# Patient Record
Sex: MALE | Race: WHITE | NOT HISPANIC OR LATINO | URBAN - METROPOLITAN AREA
[De-identification: names, ages, dates, MRNs, and addresses within clinical notes are randomized per-mention and may not be internally consistent; named-entity substitution may affect disease eponyms.]

---

## 2018-03-20 ENCOUNTER — APPOINTMENT (RX ONLY)
Dept: URBAN - METROPOLITAN AREA CLINIC 349 | Facility: CLINIC | Age: 53
Setting detail: DERMATOLOGY
End: 2018-03-20

## 2018-03-20 DIAGNOSIS — L57.0 ACTINIC KERATOSIS: ICD-10-CM

## 2018-03-20 DIAGNOSIS — L81.2 FRECKLES: ICD-10-CM

## 2018-03-20 DIAGNOSIS — D22 MELANOCYTIC NEVI: ICD-10-CM

## 2018-03-20 DIAGNOSIS — L91.8 OTHER HYPERTROPHIC DISORDERS OF THE SKIN: ICD-10-CM

## 2018-03-20 DIAGNOSIS — L57.8 OTHER SKIN CHANGES DUE TO CHRONIC EXPOSURE TO NONIONIZING RADIATION: ICD-10-CM

## 2018-03-20 PROBLEM — D22.5 MELANOCYTIC NEVI OF TRUNK: Status: ACTIVE | Noted: 2018-03-20

## 2018-03-20 PROBLEM — J30.1 ALLERGIC RHINITIS DUE TO POLLEN: Status: ACTIVE | Noted: 2018-03-20

## 2018-03-20 PROBLEM — L20.84 INTRINSIC (ALLERGIC) ECZEMA: Status: ACTIVE | Noted: 2018-03-20

## 2018-03-20 PROBLEM — F32.9 MAJOR DEPRESSIVE DISORDER, SINGLE EPISODE, UNSPECIFIED: Status: ACTIVE | Noted: 2018-03-20

## 2018-03-20 PROBLEM — I25.10 ATHEROSCLEROTIC HEART DISEASE OF NATIVE CORONARY ARTERY WITHOUT ANGINA PECTORIS: Status: ACTIVE | Noted: 2018-03-20

## 2018-03-20 PROCEDURE — 99202 OFFICE O/P NEW SF 15 MIN: CPT | Mod: 25

## 2018-03-20 PROCEDURE — 17003 DESTRUCT PREMALG LES 2-14: CPT

## 2018-03-20 PROCEDURE — ? COUNSELING

## 2018-03-20 PROCEDURE — ? SKIN TAG REMOVAL

## 2018-03-20 PROCEDURE — 11200 RMVL SKIN TAGS UP TO&INC 15: CPT | Mod: 59

## 2018-03-20 PROCEDURE — ? LIQUID NITROGEN

## 2018-03-20 PROCEDURE — 17000 DESTRUCT PREMALG LESION: CPT

## 2018-03-20 ASSESSMENT — LOCATION DETAILED DESCRIPTION DERM
LOCATION DETAILED: LEFT CENTRAL MALAR CHEEK
LOCATION DETAILED: LEFT LATERAL SUPERIOR CHEST
LOCATION DETAILED: RIGHT INFERIOR CENTRAL MALAR CHEEK
LOCATION DETAILED: LEFT POSTERIOR SHOULDER
LOCATION DETAILED: LEFT SUPERIOR PARIETAL SCALP
LOCATION DETAILED: RIGHT POSTERIOR SHOULDER
LOCATION DETAILED: INFERIOR MID FOREHEAD
LOCATION DETAILED: LEFT LATERAL INFERIOR CHEST
LOCATION DETAILED: STERNUM
LOCATION DETAILED: RIGHT ANTERIOR SHOULDER
LOCATION DETAILED: LEFT CHIN
LOCATION DETAILED: LEFT CENTRAL PARIETAL SCALP
LOCATION DETAILED: RIGHT SUPERIOR PARIETAL SCALP

## 2018-03-20 ASSESSMENT — LOCATION SIMPLE DESCRIPTION DERM
LOCATION SIMPLE: INFERIOR FOREHEAD
LOCATION SIMPLE: CHIN
LOCATION SIMPLE: RIGHT SHOULDER
LOCATION SIMPLE: CHEST
LOCATION SIMPLE: LEFT CHEEK
LOCATION SIMPLE: LEFT SHOULDER
LOCATION SIMPLE: RIGHT CHEEK
LOCATION SIMPLE: SCALP

## 2018-03-20 ASSESSMENT — LOCATION ZONE DERM
LOCATION ZONE: SCALP
LOCATION ZONE: TRUNK
LOCATION ZONE: ARM
LOCATION ZONE: FACE

## 2018-03-20 NOTE — PROCEDURE: SKIN TAG REMOVAL
Add Associated Diagnoses If Applicable When Selecting Medical Necessity: Yes
Medical Necessity Information: It is in your best interest to select a reason for this procedure from the list below. All of these items fulfill various CMS LCD requirements except the new and changing color options.
Include Z78.9 (Other Specified Conditions Influencing Health Status) As An Associated Diagnosis?: No
Anesthesia Volume In Cc: 0.1
Detail Level: Detailed
Anesthesia Type: 1% Xylocaine with epinephrine
Medical Necessity Clause: This procedure was medically necessary because the lesions that were treated were:
Consent: Written consent obtained and the risks of skin tag removal was reviewed with the patient including but not limited to bleeding, pigmentary change, infection, pain, and remote possibility of scarring. after the procedure, the patient was observed for 5-10 minutes and was oriented to,person, place and time and denied feeling dizzy, queasy and stated that they were not going to faint

## 2018-06-29 ENCOUNTER — APPOINTMENT (RX ONLY)
Dept: URBAN - METROPOLITAN AREA CLINIC 349 | Facility: CLINIC | Age: 53
Setting detail: DERMATOLOGY
End: 2018-06-29

## 2018-06-29 DIAGNOSIS — B35.1 TINEA UNGUIUM: ICD-10-CM

## 2018-06-29 DIAGNOSIS — L20.89 OTHER ATOPIC DERMATITIS: ICD-10-CM | Status: STABLE

## 2018-06-29 DIAGNOSIS — L43.8 OTHER LICHEN PLANUS: ICD-10-CM | Status: INADEQUATELY CONTROLLED

## 2018-06-29 PROBLEM — L20.84 INTRINSIC (ALLERGIC) ECZEMA: Status: ACTIVE | Noted: 2018-06-29

## 2018-06-29 PROCEDURE — ? PRESCRIPTION

## 2018-06-29 PROCEDURE — 99213 OFFICE O/P EST LOW 20 MIN: CPT

## 2018-06-29 PROCEDURE — ? COUNSELING

## 2018-06-29 PROCEDURE — ? TREATMENT REGIMEN

## 2018-06-29 RX ORDER — TAVABOROLE 43.5 MG/ML
SOLUTION TOPICAL
Qty: 1 | Refills: 11 | Status: ERX | COMMUNITY
Start: 2018-06-29

## 2018-06-29 RX ORDER — TRIAMCINOLONE ACETONIDE 1 MG/G
PASTE TOPICAL
Qty: 3 | Refills: 6 | Status: ERX | COMMUNITY
Start: 2018-06-29

## 2018-06-29 RX ORDER — CLOBETASOL PROPIONATE 0.5 MG/G
CREAM TOPICAL
Qty: 1 | Refills: 2 | Status: ERX | COMMUNITY
Start: 2018-06-29

## 2018-06-29 RX ADMIN — CLOBETASOL PROPIONATE: 0.5 CREAM TOPICAL at 00:00

## 2018-06-29 RX ADMIN — TRIAMCINOLONE ACETONIDE: 1 PASTE TOPICAL at 00:00

## 2018-06-29 RX ADMIN — TAVABOROLE: 43.5 SOLUTION TOPICAL at 00:00

## 2018-06-29 ASSESSMENT — LOCATION SIMPLE DESCRIPTION DERM
LOCATION SIMPLE: RIGHT GREAT TOE
LOCATION SIMPLE: LEFT 2ND TOE
LOCATION SIMPLE: CHEST
LOCATION SIMPLE: LEFT 3RD TOE
LOCATION SIMPLE: RIGHT 2ND TOE
LOCATION SIMPLE: LEFT GREAT TOE
LOCATION SIMPLE: LEFT BUCCAL MUCOSA
LOCATION SIMPLE: RIGHT 4TH TOE
LOCATION SIMPLE: LEFT 5TH TOE
LOCATION SIMPLE: RIGHT 3RD TOE

## 2018-06-29 ASSESSMENT — LOCATION DETAILED DESCRIPTION DERM
LOCATION DETAILED: RIGHT DORSAL 3RD TOE
LOCATION DETAILED: LEFT DORSAL 5TH TOE
LOCATION DETAILED: LEFT DORSAL 3RD TOE
LOCATION DETAILED: LEFT MEDIAL INFERIOR CHEST
LOCATION DETAILED: LEFT DORSAL GREAT TOE
LOCATION DETAILED: RIGHT DISTAL PLANTAR GREAT TOE
LOCATION DETAILED: LEFT DORSAL 2ND TOE
LOCATION DETAILED: LEFT BUCCAL MUCOSA
LOCATION DETAILED: RIGHT LATERAL 4TH TOE
LOCATION DETAILED: RIGHT DISTAL PLANTAR 2ND TOE

## 2018-06-29 ASSESSMENT — LOCATION ZONE DERM
LOCATION ZONE: MUCOUS_MEMBRANE
LOCATION ZONE: TOE
LOCATION ZONE: TRUNK

## 2019-10-09 ENCOUNTER — HOSPITAL ENCOUNTER (OUTPATIENT)
Dept: ULTRASOUND IMAGING | Age: 54
Discharge: HOME | End: 2019-10-09
Attending: FAMILY MEDICINE
Payer: COMMERCIAL

## 2019-10-09 DIAGNOSIS — R25.2 LEG CRAMPING: ICD-10-CM

## 2019-10-09 PROCEDURE — 93926 LOWER EXTREMITY STUDY: CPT

## 2019-10-09 PROCEDURE — 93925 LOWER EXTREMITY STUDY: CPT

## 2020-01-17 NOTE — PROGRESS NOTES
Pre procedural appointment call attempted x2. Unable to contact Pt at this time. VM with arrival time and procedure time left on Pt VM.  No further concerns at this time

## 2020-01-19 ENCOUNTER — ANESTHESIA EVENT (OUTPATIENT)
Dept: ENDOSCOPY | Age: 55
End: 2020-01-19
Payer: COMMERCIAL

## 2020-01-20 ENCOUNTER — HOSPITAL ENCOUNTER (OUTPATIENT)
Age: 55
Setting detail: OUTPATIENT SURGERY
Discharge: HOME | End: 2020-01-20
Attending: SURGERY | Admitting: SURGERY
Payer: COMMERCIAL

## 2020-01-20 ENCOUNTER — ANESTHESIA (OUTPATIENT)
Dept: ENDOSCOPY | Age: 55
End: 2020-01-20
Payer: COMMERCIAL

## 2020-01-20 VITALS
BODY MASS INDEX: 25.92 KG/M2 | TEMPERATURE: 98 F | DIASTOLIC BLOOD PRESSURE: 70 MMHG | RESPIRATION RATE: 18 BRPM | OXYGEN SATURATION: 98 % | HEART RATE: 66 BPM | HEIGHT: 69 IN | WEIGHT: 175 LBS | SYSTOLIC BLOOD PRESSURE: 114 MMHG

## 2020-01-20 PROCEDURE — 76060000032 HC ANESTHESIA 0.5 TO 1 HR: Performed by: SURGERY

## 2020-01-20 PROCEDURE — 76040000025: Performed by: SURGERY

## 2020-01-20 PROCEDURE — 74011250636 HC RX REV CODE- 250/636: Performed by: ANESTHESIOLOGY

## 2020-01-20 PROCEDURE — 74011250636 HC RX REV CODE- 250/636: Performed by: NURSE ANESTHETIST, CERTIFIED REGISTERED

## 2020-01-20 RX ORDER — HYDROMORPHONE HYDROCHLORIDE 2 MG/ML
0.5 INJECTION, SOLUTION INTRAMUSCULAR; INTRAVENOUS; SUBCUTANEOUS
Status: DISCONTINUED | OUTPATIENT
Start: 2020-01-20 | End: 2020-01-20 | Stop reason: HOSPADM

## 2020-01-20 RX ORDER — SODIUM CHLORIDE 0.9 % (FLUSH) 0.9 %
5-40 SYRINGE (ML) INJECTION AS NEEDED
Status: DISCONTINUED | OUTPATIENT
Start: 2020-01-20 | End: 2020-01-20 | Stop reason: HOSPADM

## 2020-01-20 RX ORDER — SODIUM CHLORIDE, SODIUM LACTATE, POTASSIUM CHLORIDE, CALCIUM CHLORIDE 600; 310; 30; 20 MG/100ML; MG/100ML; MG/100ML; MG/100ML
75 INJECTION, SOLUTION INTRAVENOUS CONTINUOUS
Status: DISCONTINUED | OUTPATIENT
Start: 2020-01-20 | End: 2020-01-20 | Stop reason: HOSPADM

## 2020-01-20 RX ORDER — PROPOFOL 10 MG/ML
INJECTION, EMULSION INTRAVENOUS
Status: DISCONTINUED | OUTPATIENT
Start: 2020-01-20 | End: 2020-01-20 | Stop reason: HOSPADM

## 2020-01-20 RX ORDER — OXYCODONE AND ACETAMINOPHEN 10; 325 MG/1; MG/1
1 TABLET ORAL AS NEEDED
Status: DISCONTINUED | OUTPATIENT
Start: 2020-01-20 | End: 2020-01-20 | Stop reason: HOSPADM

## 2020-01-20 RX ORDER — SODIUM CHLORIDE 0.9 % (FLUSH) 0.9 %
5-40 SYRINGE (ML) INJECTION EVERY 8 HOURS
Status: DISCONTINUED | OUTPATIENT
Start: 2020-01-20 | End: 2020-01-20 | Stop reason: HOSPADM

## 2020-01-20 RX ORDER — PROPOFOL 10 MG/ML
INJECTION, EMULSION INTRAVENOUS AS NEEDED
Status: DISCONTINUED | OUTPATIENT
Start: 2020-01-20 | End: 2020-01-20 | Stop reason: HOSPADM

## 2020-01-20 RX ORDER — OXYCODONE HYDROCHLORIDE 5 MG/1
5 TABLET ORAL
Status: DISCONTINUED | OUTPATIENT
Start: 2020-01-20 | End: 2020-01-20 | Stop reason: HOSPADM

## 2020-01-20 RX ADMIN — PROPOFOL 50 MG: 10 INJECTION, EMULSION INTRAVENOUS at 11:48

## 2020-01-20 RX ADMIN — PROPOFOL 50 MG: 10 INJECTION, EMULSION INTRAVENOUS at 11:51

## 2020-01-20 RX ADMIN — SODIUM CHLORIDE, SODIUM LACTATE, POTASSIUM CHLORIDE, AND CALCIUM CHLORIDE 75 ML/HR: 600; 310; 30; 20 INJECTION, SOLUTION INTRAVENOUS at 11:40

## 2020-01-20 RX ADMIN — PROPOFOL 140 MCG/KG/MIN: 10 INJECTION, EMULSION INTRAVENOUS at 11:48

## 2020-01-20 NOTE — H&P
Mojgan Tate III    1/20/2020      Subjective:     Patient is a 47 y.o. male who was sent by their primary care physician for screening colonoscopy. Pt denies any symptoms of abdominal pain, change in bowel habits, blood per rectum, unexplained weight loss, or other symptoms that would be of concern for colon cancer. Patient Active Problem List    Diagnosis Date Noted    Recurrent major depressive disorder, in remission ([facility] [address] 12/28/2016    Benign prostatic hyperplasia without lower urinary tract symptoms 06/22/2016    Coronary artery disease involving native coronary artery without angina pectoris 10/05/2015    Mixed hyperlipidemia 08/17/2015    OA (osteoarthritis)     Screening for colon cancer 02/18/2014    HTN (hypertension), benign 01/06/2014    Eczema 01/06/2014    GERD (gastroesophageal reflux disease)     Erectile dysfunction 10/01/2013    Peyronie disease 05/08/2013    Allergic rhinitis 05/08/2013     Past Medical History:   Diagnosis Date    Allergic rhinitis 5/8/2013    Benign prostatic hyperplasia without lower urinary tract symptoms 6/22/2016    CAD (coronary artery disease)     catherization    Chronic back pain     cervico-thoracic facet jt INJs done at [facility] pain clinic, last one 3/2019    Coronary artery disease involving native coronary artery without angina pectoris 10/05/2015    CCS 1821 in 7/15. LHC in 8/15 - sign obstructive dz in the LAD, stent placed. BLANK placed in RCA. He's unable to tolerate stains.  f/w Dr. Sarai Falcon Depression, prolonged 12/21/2015    Eczema 1/6/2014    GERD (gastroesophageal reflux disease)     Hypertension     OA (osteoarthritis)     Peyronie disease 5/8/2013    Urethral stricture with dilation, managed by URO    Presence of stent in LAD coronary artery 12/28/2015    Presence of stent in right coronary artery 12/28/2015    8/18/15 Drug eluting stent placed    Urolithiasis     Dr Ozzie Ramirez      Past Surgical History:   Procedure Laterality Date    CARDIAC SURG PROCEDURE UNLIST  2015    M LAD, Prox LAD, Distal RCA, LAD distal stents    HX GI      colonoscopy    HX ORTHOPAEDIC Left 2019    [facility]      Prior to Admission Medications   Prescriptions Last Dose Informant Patient Reported? Taking? NITROSTAT 0.4 mg SL tablet Not Taking at Unknown time  Yes No   PARoxetine (PAXIL) 20 mg tablet 2020 at Unknown time  No Yes   Sig: Take 1 Tab by mouth daily. alirocumab (PRALUENT PEN) 75 mg/mL injector pen 2019 at Unknown time  Yes Yes   Si mg by SubCUTAneous route Once every 2 weeks. aspirin delayed-release 81 mg tablet 2020 at Unknown time  Yes Yes   Sig: Take  by mouth daily. baclofen (LIORESAL) 10 mg tablet 2020 at Unknown time  Yes Yes   Sig: Take 20 mg by mouth three (3) times daily. celecoxib (CELEBREX) 200 mg capsule 2020 at Unknown time  Yes Yes   Sig: Take 200 mg by mouth two (2) times a day. clobetasol (TEMOVATE) 0.05 % topical cream 2020 at Unknown time  No Yes   Sig: Apply to affected area bid prn for up to 2 weeks-avoid face and groin   clopidogrel (PLAVIX) 75 mg tab Not Taking at Unknown time  No No   Sig: Take 1 Tab by mouth daily. diclofenac (FLECTOR) 1.3 % pt12 2019 at Unknown time  No Yes   Si Patch by TransDERmal route every twelve (12) hours every twelve (12) hours. famotidine (PEPCID) 20 mg tablet 2020 at Unknown time  No Yes   Sig: Take 1 Tab by mouth two (2) times a day. finasteride (PROSCAR) 5 mg tablet 2020 at Unknown time  No Yes   Sig: take 1 tablet by mouth once daily   gabapentin (NEURONTIN) 300 mg capsule 2020 at Unknown time  Yes Yes   Sig: Take 300 mg by mouth three (3) times daily. lisinopril (PRINIVIL, ZESTRIL) 2.5 mg tablet 2020 at Unknown time  No Yes   Sig: take 1 tablet by mouth once daily   nebivolol (BYSTOLIC) 5 mg tablet 9478 at Unknown time  No Yes   Sig: Take 1 Tab by mouth daily.    rivaroxaban (XARELTO PO) 2020  Yes Yes   Sig: Take 2.5 mg by mouth two (2) times a day. sildenafil citrate (VIAGRA) 100 mg tablet 2020 at Unknown time  No Yes   Sig: Take 1 Tab by mouth as needed for Other (ED). traMADol (ULTRAM) 50 mg tablet 2020 at Unknown time  Yes Yes   Sig: Take 50 mg by mouth every six (6) hours as needed. triamcinolone acetonide (KENALOG) 0.1 % dental paste Not Taking at Unknown time  Yes No   Sig: by Dental route two (2) times a day. Facility-Administered Medications: None     Allergies   Allergen Reactions    Codeine Itching and Nausea and Vomiting    Pcn [Penicillins] Hives    Statins-Hmg-Coa Reductase Inhibitors Myalgia      Social History     Tobacco Use    Smoking status: Never Smoker    Smokeless tobacco: Never Used   Substance Use Topics    Alcohol use: No     Comment: occ      Social History     Patient does not qualify to have social determinant information on file (likely too young). Social History Narrative     to      Family History   Problem Relation Age of Onset    Colon Cancer Mother          developed early 40s(NEUROFIBROMATOSIS)    Other Mother         neurofibromatosis    Cancer Mother     Stroke Father     Hypertension Father     Elevated Lipids Father     Heart Disease Other         GF- age 52        No current facility-administered medications for this encounter. Review of Systems  A comprehensive review of systems was negative except for that written in the HPI. Objective: There were no vitals filed for this visit. PHYSICAL EXAM     Gen- the patient is well developed and in no acute distress  HEENT- PERRL, EOMI, no scleral icterus       nose without alar flaring or epistaxis                  oral muscosa moist without cyanosis  Neck- no JVD or retractions  Lungs-clear  Heart- RRR without M,G,R  Abd- soft and non-tender; with positive bowel sounds. Ext- warm without cyanosis.  There is no lower leg edema.  Skin- no jaundice or rashes, no wounds   Neuro- alert and oriented x 3. No gross sensorimotor deficits are present. Plan:     Age appropriate screening colonoscopy. I have discussed the risks, benefits and alternatives of surgery. Pt understands and wishes to proceed.   Consent obtained           Rona Linares MD

## 2020-01-20 NOTE — DISCHARGE INSTRUCTIONS
Gastrointestinal Colonoscopy/Flexible Sigmoidoscopy - Lower Exam Discharge Instructions  1. Call Dr. Rachid Pineda at [phone] for any problems or questions. 2. Contact the doctors office for follow up appointment as directed  3. Medication may cause drowsiness for several hours, therefore:  · Do not drive or operate machinery for reminder of the day. · No alcohol today. · Do not make any important or legal decisions for 24 hours. · Do not sign any legal documents for 24 hours. 4. Ordinarily, you may resume regular diet and activity after exam unless otherwise specified by your physician. 5. Because of air put into your colon during exam, you may experience some abdominal distension, relieved by the passage of gas, for several hours. 6. Contact your physician if you have any of the following:  · Excessive amount of bleeding - large amount when having a bowel movement. Occasional specks of blood with bowel movement would not be unusual.  · Severe abdominal pain  · Fever or Chills        Instructions given to Cris Payan III and other family members.

## 2020-01-20 NOTE — ANESTHESIA POSTPROCEDURE EVALUATION
Procedure(s):  COLONOSCOPY/ 27.    total IV anesthesia    Anesthesia Post Evaluation      Multimodal analgesia: multimodal analgesia used between 6 hours prior to anesthesia start to PACU discharge  Patient location during evaluation: PACU  Patient participation: complete - patient participated  Level of consciousness: awake  Airway patency: patent  Anesthetic complications: no  Cardiovascular status: acceptable  Respiratory status: acceptable  Hydration status: acceptable  Post anesthesia nausea and vomiting:  none      Vitals Value Taken Time   /65 1/20/2020 12:12 PM   Temp 36.7 °C (98 °F) 1/20/2020 12:12 PM   Pulse 79 1/20/2020 12:15 PM   Resp 16 1/20/2020 12:12 PM   SpO2 99 % 1/20/2020 12:15 PM   Vitals shown include unvalidated device data.

## 2020-01-20 NOTE — ROUTINE PROCESS
VSS. Discharge instructions reviewed with patient and Dunia Daniel, spouse and copy of instructions sent home with patient. Dr. Chad Ríos spoke with patient and spouse prior to discharge. Questions answered. [facility] discontinued prior to [facility].      Personal items with patient at discharge: clothing, glasses

## 2020-01-20 NOTE — ANESTHESIA PREPROCEDURE EVALUATION
Relevant Problems   No relevant active problems       Anesthetic History   No history of anesthetic complications            Review of Systems / Medical History  Patient summary reviewed and pertinent labs reviewed    Pulmonary                   Neuro/Psych         Psychiatric history    Comments: Hx of depression Cardiovascular    Hypertension          CAD and cardiac stents      Comments: Stents X 4 2015   GI/Hepatic/Renal     GERD           Endo/Other        Arthritis     Other Findings            Physical Exam    Airway  Mallampati: II  TM Distance: > 6 cm  Neck ROM: normal range of motion   Mouth opening: Normal     Cardiovascular    Rhythm: regular           Dental    Dentition: Caps/crowns     Pulmonary                 Abdominal  GI exam deferred       Other Findings            Anesthetic Plan    ASA: 3  Anesthesia type: total IV anesthesia          Induction: Intravenous  Anesthetic plan and risks discussed with: Patient

## 2020-09-28 ENCOUNTER — HOSPITAL ENCOUNTER (OUTPATIENT)
Dept: MRI IMAGING | Age: 55
Discharge: HOME | End: 2020-09-28
Attending: FAMILY MEDICINE
Payer: COMMERCIAL

## 2020-09-28 DIAGNOSIS — Z82.0 FAMILY HISTORY OF MS (MULTIPLE SCLEROSIS): ICD-10-CM

## 2020-09-28 DIAGNOSIS — R68.89 FORGETFULNESS: ICD-10-CM

## 2020-09-28 PROCEDURE — 70551 MRI BRAIN STEM W/O DYE: CPT

## 2021-04-02 ENCOUNTER — HOSPITAL ENCOUNTER (OUTPATIENT)
Dept: GENERAL RADIOLOGY | Age: 56
Discharge: HOME | End: 2021-04-02
Payer: COMMERCIAL

## 2021-04-02 DIAGNOSIS — G89.29 CHRONIC LEFT SHOULDER PAIN: ICD-10-CM

## 2021-04-02 DIAGNOSIS — M54.6 CHRONIC MIDLINE THORACIC BACK PAIN: ICD-10-CM

## 2021-04-02 DIAGNOSIS — M25.512 CHRONIC LEFT SHOULDER PAIN: ICD-10-CM

## 2021-04-02 DIAGNOSIS — G89.29 CHRONIC MIDLINE THORACIC BACK PAIN: ICD-10-CM

## 2021-04-02 PROCEDURE — 73030 X-RAY EXAM OF SHOULDER: CPT

## 2021-04-02 PROCEDURE — 72072 X-RAY EXAM THORAC SPINE 3VWS: CPT

## 2021-07-08 PROBLEM — N18.4 CKD (CHRONIC KIDNEY DISEASE) STAGE 4, GFR 15-29 ML/MIN (HCC): Status: ACTIVE | Noted: 2021-07-01

## 2021-07-08 PROBLEM — M31.31 GRANULOMATOSIS WITH POLYANGIITIS WITH RENAL INVOLVEMENT (HCC): Status: ACTIVE | Noted: 2021-06-15

## 2021-07-08 PROBLEM — Z79.4 TYPE 2 DIABETES MELLITUS, WITH LONG-TERM CURRENT USE OF INSULIN (HCC): Status: ACTIVE | Noted: 2021-01-01

## 2021-07-08 PROBLEM — E11.9 TYPE 2 DIABETES MELLITUS, WITH LONG-TERM CURRENT USE OF INSULIN (HCC): Status: ACTIVE | Noted: 2021-01-01

## 2021-07-08 PROBLEM — D75.839 THROMBOCYTOSIS: Status: ACTIVE | Noted: 2021-06-01

## 2021-07-08 PROBLEM — D50.9 MICROCYTIC ANEMIA: Status: ACTIVE | Noted: 2021-06-01

## 2021-07-08 PROBLEM — Z79.630: Status: ACTIVE | Noted: 2021-07-01

## 2021-07-08 PROBLEM — M18.9 OSTEOARTHRITIS OF CARPOMETACARPAL (CMC) JOINT OF THUMB: Status: ACTIVE | Noted: 2017-11-02

## 2022-03-18 PROBLEM — D50.9 MICROCYTIC ANEMIA: Status: ACTIVE | Noted: 2021-06-01

## 2022-03-18 PROBLEM — Z79.630: Status: ACTIVE | Noted: 2021-07-01

## 2022-03-18 PROBLEM — E11.9 TYPE 2 DIABETES MELLITUS, WITH LONG-TERM CURRENT USE OF INSULIN (HCC): Status: ACTIVE | Noted: 2021-01-01

## 2022-03-18 PROBLEM — N18.4 CKD (CHRONIC KIDNEY DISEASE) STAGE 4, GFR 15-29 ML/MIN (HCC): Status: ACTIVE | Noted: 2021-07-01

## 2022-03-18 PROBLEM — Z79.4 TYPE 2 DIABETES MELLITUS, WITH LONG-TERM CURRENT USE OF INSULIN (HCC): Status: ACTIVE | Noted: 2021-01-01

## 2022-03-19 PROBLEM — D75.839 THROMBOCYTOSIS: Status: ACTIVE | Noted: 2021-06-01

## 2022-03-19 PROBLEM — M31.31 GRANULOMATOSIS WITH POLYANGIITIS WITH RENAL INVOLVEMENT (HCC): Status: ACTIVE | Noted: 2021-06-15

## 2022-03-20 PROBLEM — M18.9 OSTEOARTHRITIS OF CARPOMETACARPAL (CMC) JOINT OF THUMB: Status: ACTIVE | Noted: 2017-11-02

## 2022-06-02 ENCOUNTER — OFFICE VISIT (OUTPATIENT)
Dept: ENT CLINIC | Age: 57
End: 2022-06-02
Payer: MEDICARE

## 2022-06-02 VITALS
HEIGHT: 68 IN | SYSTOLIC BLOOD PRESSURE: 138 MMHG | BODY MASS INDEX: 27.58 KG/M2 | DIASTOLIC BLOOD PRESSURE: 92 MMHG | WEIGHT: 182 LBS

## 2022-06-02 DIAGNOSIS — H65.91 OME (OTITIS MEDIA WITH EFFUSION), RIGHT: ICD-10-CM

## 2022-06-02 DIAGNOSIS — M31.30 GRANULOMATOSIS WITH POLYANGIITIS, UNSPECIFIED WHETHER RENAL INVOLVEMENT (HCC): ICD-10-CM

## 2022-06-02 DIAGNOSIS — J32.0 CHRONIC MAXILLARY SINUSITIS: Primary | ICD-10-CM

## 2022-06-02 PROCEDURE — 99204 OFFICE O/P NEW MOD 45 MIN: CPT | Performed by: OTOLARYNGOLOGY

## 2022-06-02 PROCEDURE — G8419 CALC BMI OUT NRM PARAM NOF/U: HCPCS | Performed by: OTOLARYNGOLOGY

## 2022-06-02 PROCEDURE — 1036F TOBACCO NON-USER: CPT | Performed by: OTOLARYNGOLOGY

## 2022-06-02 PROCEDURE — 69436 CREATE EARDRUM OPENING: CPT | Performed by: OTOLARYNGOLOGY

## 2022-06-02 PROCEDURE — G8428 CUR MEDS NOT DOCUMENT: HCPCS | Performed by: OTOLARYNGOLOGY

## 2022-06-02 PROCEDURE — 3017F COLORECTAL CA SCREEN DOC REV: CPT | Performed by: OTOLARYNGOLOGY

## 2022-06-02 RX ORDER — ALIROCUMAB 75 MG/ML
INJECTION, SOLUTION SUBCUTANEOUS
COMMUNITY
Start: 2020-12-10

## 2022-06-02 RX ORDER — PREDNISONE 20 MG/1
80 TABLET ORAL DAILY
COMMUNITY
Start: 2022-05-03

## 2022-06-02 RX ORDER — SEVELAMER HYDROCHLORIDE 800 MG/1
1600 TABLET, FILM COATED ORAL
COMMUNITY

## 2022-06-02 RX ORDER — LISINOPRIL 2.5 MG/1
TABLET ORAL
Status: ON HOLD | COMMUNITY
Start: 2020-12-14 | End: 2022-11-02

## 2022-06-02 RX ORDER — GABAPENTIN 100 MG/1
CAPSULE ORAL
COMMUNITY
Start: 2022-04-22 | End: 2022-10-12

## 2022-06-02 RX ORDER — SENNA PLUS 8.6 MG/1
8.6 TABLET ORAL PRN
COMMUNITY

## 2022-06-02 RX ORDER — SEVELAMER CARBONATE 800 MG/1
TABLET, FILM COATED ORAL
COMMUNITY
Start: 2022-05-20

## 2022-06-02 RX ORDER — PANTOPRAZOLE SODIUM 40 MG/1
40 TABLET, DELAYED RELEASE ORAL 2 TIMES DAILY
COMMUNITY
Start: 2022-05-03

## 2022-06-02 RX ORDER — NITROGLYCERIN 0.4 MG/1
0.4 TABLET SUBLINGUAL EVERY 5 MIN PRN
COMMUNITY
Start: 2015-08-17

## 2022-06-02 ASSESSMENT — ENCOUNTER SYMPTOMS
COUGH: 0
WHEEZING: 0
COLOR CHANGE: 0
DIARRHEA: 0
VOMITING: 0
EYE PAIN: 0

## 2022-06-02 NOTE — PROGRESS NOTES
Chief Complaint   Patient presents with    Results     Patient is here to get his CT results and to see what to do next. HPI:  Etta Patten III is a 64 y.o. male seen today to review recent CT scan. He has Wegener's granulomatosis which had been in remission but has since recurred. He had recent CT scan which revealed maxillary sinusitis. He mainly c/o facial pain and HA tristin across temporal region- sxs are worse on R side. There is even some blood-tinged nasal drainage. He is s/p previous FESS 2 yrs ago for what he describes as a fungal ball. His olfaction is intact. He was recently in hospital for IV abx and is on chronic Bactrim therapy right now. He also reports muffled hearing on R side over past mo or so. Past Medical History, Past Surgical History, Family history, Social History, and Medications were all reviewed with the patient today and updated as necessary.      Allergies   Allergen Reactions    Codeine Itching and Nausea And Vomiting    Penicillins Hives    Statins Other (See Comments)     Patient Active Problem List   Diagnosis    CKD (chronic kidney disease) stage 4, GFR 15-29 ml/min ([address]    Encounter for long term current use of cyclophosphamide    Type 2 diabetes mellitus, with long-term current use of insulin (HCC)    Microcytic anemia    Thrombocytosis    Recurrent major depressive disorder, in remission ([address]    Eczema    OA (osteoarthritis)    Coronary artery disease involving native coronary artery without angina pectoris    Mixed hyperlipidemia    GERD (gastroesophageal reflux disease)    Peyronie disease    Erectile dysfunction    Granulomatosis with polyangiitis with renal involvement (HCC)    Benign prostatic hyperplasia without lower urinary tract symptoms    Allergic rhinitis    Osteoarthritis of carpometacarpal (CMC) joint of thumb    HTN (hypertension), benign     Current Outpatient Medications   Medication Sig    sevelamer hcl (RENAGEL) 800 MG tablet Take 1,600 mg by mouth 3 times daily (with meals)    sevelamer (RENVELA) 800 MG tablet TAKE 3 TABLETS BY MOUTH WITH MEALS THEN 1 WITH SNACKS    senna (SENOKOT) 8.6 MG tablet Take 8.6 mg by mouth as needed    predniSONE (DELTASONE) 20 MG tablet Take 80 mg by mouth daily    pantoprazole (PROTONIX) 40 MG tablet Take 40 mg by mouth 2 times daily    nitroGLYCERIN (NITROSTAT) 0.4 MG SL tablet Place 0.4 mg under the tongue every 5 minutes as needed    lisinopril (PRINIVIL;ZESTRIL) 2.5 MG tablet     gabapentin (NEURONTIN) 100 MG capsule TAKE 1 CAPSULE BY MOUTH EVERY OTHER DAY (DO NOT START BEFORE 4-22-22)    Evolocumab 140 MG/ML SOAJ Inject 140 mg into the skin every 14 days    Coenzyme Q10 300 MG CAPS Take 1 capsule by mouth daily    alirocumab (PRALUENT) 75 MG/ML SOAJ injection pen INJECT 75MG SUBCUTANEOUSLY EVERY 14 DAYS    amLODIPine (NORVASC) 10 MG tablet Take 10 mg by mouth daily    aspirin 81 MG EC tablet Take by mouth daily    budesonide (PULMICORT) 0.5 MG/2ML nebulizer suspension Inhale 500 mcg into the lungs 2 times daily    clobetasol (TEMOVATE) 0.05 % cream Apply to affected area bid prn for up to 2 weeks-avoid face and groin    diclofenac (FLECTOR) 1.3 % PTCH patch USE 1 TOPICALLY EVERY 12 HOURS    famotidine (PEPCID) 40 MG tablet Take 40 mg by mouth 2 times daily    finasteride (PROSCAR) 5 MG tablet Take 1 tablet by mouth once daily    furosemide (LASIX) 80 MG tablet Take 80 mg by mouth every other day    gabapentin (NEURONTIN) 300 MG capsule 1 capsule    nebivolol (BYSTOLIC) 10 MG tablet Take 1 tablet by mouth daily    ondansetron (ZOFRAN-ODT) 8 MG TBDP disintegrating tablet Take 8 mg by mouth every 8 hours as needed    PARoxetine (PAXIL) 30 MG tablet Take 30 mg by mouth daily    trimethoprim-polymyxin b (POLYTRIM) 25192-8.1 UNIT/ML-% ophthalmic solution 1 drop daily    traMADol (ULTRAM) 50 MG tablet Take 50 mg by mouth every 6 hours as needed.     triamcinolone acetonide (KENALOG) 0.1 % paste Place onto teeth 2 times daily     No current facility-administered medications for this visit. Past Medical History:   Diagnosis Date    Allergic rhinitis 2013    Benign prostatic hyperplasia without lower urinary tract symptoms 2016    CAD (coronary artery disease)     catherization    Chronic back pain     cervico-thoracic facet jt INJs done at [facility] pain clinic, last one 3/2019    Coronary artery disease involving native coronary artery without angina pectoris 10/05/2015    CCS 1821 in 7/15. LHC in 8/15 - sign obstructive dz in the LAD, stent placed. SUSANNE placed in RCA. He's unable to tolerate stains.  f/w Dr. Heloi Becerra Depression, prolonged 2015    Eczema 2014    GERD (gastroesophageal reflux disease)     Granulomatosis with polyangiitis ([facility] [address]     Hypertension     OA (osteoarthritis)     Peritoneal dialysis status ([facility] [address]     Peyronie disease 2013    Urethral stricture with dilation, managed by URO    Presence of stent in LAD coronary artery 2015    Presence of stent in right coronary artery 2015    8/18/15 Drug eluting stent placed    Sigmoid diverticulosis     mild    Urolithiasis     Dr Johanny Partida     Social History     Tobacco Use    Smoking status: Never Smoker    Smokeless tobacco: Never Used   Substance Use Topics    Alcohol use: No     Past Surgical History:   Procedure Laterality Date    COLONOSCOPY N/A 2020    COLONOSCOPY/ 27 performed by Zenobia Krish[facility]urthy [facility] at [facility]    GI      colonoscopy    ORTHOPEDIC SURGERY Left 2019    [address] Arthroplasty    OTHER SURGICAL HISTORY  2021    catheter for HD    [place] CARDIAC SURG PROCEDURE UNLIST  2015    M LAD, Prox LAD, Distal RCA, LAD distal stents     Family History   Problem Relation Age of Onset    Heart Disease Other         GF- age 54    Elevated Lipids Father     Hypertension Father     Stroke Father    Sanchez Other Mother neurofibromatosis    Colon Cancer Mother          developed early 40s(NEUROFIBROMATOSIS)        ROS:    Review of Systems   Constitutional: Negative for chills. Eyes: Negative for pain. Respiratory: Negative for cough and wheezing. Cardiovascular: Negative for chest pain and palpitations. Gastrointestinal: Negative for diarrhea and vomiting. Skin: Negative for color change and wound. Allergic/Immunologic: Negative for environmental allergies. Neurological: Negative for dizziness and headaches. PHYSICAL EXAM:    BP (!) 138/92 (Site: Left Upper Arm, Position: Sitting)   Ht 5' 8\" (1.727 m)   Wt 182 lb (82.6 kg)   BMI 27.67 kg/m²     General: NAD, well-appearing  Neuro: No gross neuro deficits. No facial weakness. Eyes: No periorbital edema/ecchymosis. No nystagmus. Skin: No facial erythema, rashes or concerning lesions. Nose: No external deviations or saddling. Intranasally, septum is midline without perforations, dried nasal mucosa w/ some blood tinged mucoid drainage- no polyps noted. Mouth: Moist mucus membranes, normal tongue/palate mobility, no concerning mucosal lesions. Oropharynx clear with no erythema/exudate, no tonsillar hypertrophy. Ears: Normal appearing auricles, no hematomas. Clear canal on R side, there is cerumen impaction on L side (cleaned out), healthy canal skin, TM's intact with no perforations or retraction pockets, L ME space is clear but there is serous middle ear effusion on R side. Neck: Soft, supple, no palpable neck masses. No palpable parotid or submandibular masses. No thyromegaly or palpable thyroid nodules. Lymphatics: No palpable cervical LAD. Resp: No audible stridor or wheezing. CV: No murmurs, no JVD. Extremities: No clubbing or cyanosis. PROCEDURE: R myringotomy w/ placement of tympanostomy tube  INDICATIONS: R  DESCRIPTION: Verbal consent obtained. Timeout performed. After cleaning out both EACs, I anesthetized the R TM w/ topical Phenol. Next, I used a myringotomy blade to make a radial incision along the anteroinferior quadrant of the TM and after suctioning out a serous middle ear effusion, a Angel-Bobbin tube was placed without difficulty. There was minimal bleeding and the patient tolerated the procedure well w/ no complications. IMAGING:  I reviewed his recent CT sinus from [facility] that he brought on a CD- 5/12/22  FINDINGS:   .  Zygomatic arches, pterygoid plates, and nasal arches are intact. Roz Slimmer mandibular or midface fracture.  Dental work beam hardening artifact is suggested. Rubbie Cellar evidence of globe injury.  No retrobulbar hemorrhage or infiltration of the retrobulbar fat.  Extraocular muscles, optic nerve sheath complexes, and lacrimal glands are normal.     . Previous sinus surgery is most apparent on the left. Opacification of the right ostiomeatal complex is seen. An air-fluid level is noted in the right maxillary sinus. This may be related to acute sinusitis in the absence of direct trauma in this area producing a fracture and hemorrhage. Complete opacification of the left maxillary sinus with a small calcification is seen. Significant thickening of the adjacent wall is seen. This is consistent with chronic sinusitis. Bacterial or fungal infections may be considered. Opacification of some of the ethmoid air cells bilaterally is seen. Mild opacification of the right sphenoid sinus posteriorly is seen.  Relative sparing of the frontal and left sphenoid sinuses is seen. Mild deviation of the posterior nasal septum from the right to left with a nasal septal spur is seen. Several right mastoid air cells are opacified. The left mastoid air cells are clear. .  No radiopaque foreign body.       The submucosal, mucosal, and parapharyngeal spaces are symmetric. The partially imaged parotid glands are unremarkable. The intracranial region is incompletely imaged. No acute abnormality is seen.  Atherosclerotic calcifications are seen. IMPRESSION:  -Air-fluid level in the right maxillary sinus. Consistent with acute sinusitis.   -Complete opacification of the left maxillary sinus with small internal calcification. Adjacent sinus wall thickening. Suspicious for chronic process. Bacterial and fungal infections may be considered. ASSESSMENT and PLAN      ICD-10-CM    1. Chronic maxillary sinusitis  J32.0    2. Granulomatosis with polyangiitis, unspecified whether renal involvement ([address]  M31.30    3. OME (otitis media with effusion), right  H65.91 Tympanometry     OK CREATE EARDRUM OPENING,GEN ANESTH     His CT scan revealed L > R chronic max sinusitis. I see no contraindication to restarting immunosuppressive medications for his GPA w/ this sinusitis seen on his scan. He may consider revision FESS but the changes seen tristin on L side all appear very chronic in nature- he likely has a recurrent fungal ball on that side. He also had R OME and I placed a tube in that ear today. He will continue w/ conservative measures for his sinuses and I will re-evaluate in 6 wks.     London Rod MD  6/2/2022    Electronically signed by London Rod MD on 6/2/2022 at 9:03 PM

## 2022-07-15 ENCOUNTER — OFFICE VISIT (OUTPATIENT)
Dept: ENT CLINIC | Age: 57
End: 2022-07-15
Payer: MEDICARE

## 2022-07-15 VITALS — HEIGHT: 68 IN | WEIGHT: 189 LBS | BODY MASS INDEX: 28.64 KG/M2 | RESPIRATION RATE: 18 BRPM

## 2022-07-15 DIAGNOSIS — M31.30 GRANULOMATOSIS WITH POLYANGIITIS, UNSPECIFIED WHETHER RENAL INVOLVEMENT (HCC): ICD-10-CM

## 2022-07-15 DIAGNOSIS — Z45.89 TYMPANOSTOMY TUBE CHECK: Primary | ICD-10-CM

## 2022-07-15 PROCEDURE — 99213 OFFICE O/P EST LOW 20 MIN: CPT | Performed by: OTOLARYNGOLOGY

## 2022-07-15 RX ORDER — MINOXIDIL 10 MG/1
TABLET ORAL
COMMUNITY
Start: 2022-06-21

## 2022-07-15 RX ORDER — EZETIMIBE 10 MG/1
TABLET ORAL
COMMUNITY
Start: 2022-07-09

## 2022-07-15 ASSESSMENT — ENCOUNTER SYMPTOMS
DIARRHEA: 0
WHEEZING: 0
VOMITING: 0
COLOR CHANGE: 0
EYE PAIN: 0
COUGH: 0

## 2022-07-15 NOTE — PROGRESS NOTES
Chief Complaint   Patient presents with    Follow-up     Patient states that he is doing much better than he was before. HPI:  Morgan Novak III is a 64 y.o. male seen in follow-up for his sinuses and ear. He has h/po GPA and his sinuses had been bad earlier this Spring. I also had to place a tube in R ear recently for OME. Last seen on 6/2/22. Doing better from ear standpoint w/ improved hearing on R side. He has had 3 infusions recently and his sinuses are better as well. No significant drainage or crusting and no facial pressure at all. Not currently on any rinses or sinus irrigations    Past Medical History, Past Surgical History, Family history, Social History, and Medications were all reviewed with the patient today and updated as necessary.      Allergies   Allergen Reactions    Codeine Itching and Nausea And Vomiting    Penicillins Hives    Statins Other (See Comments)     Patient Active Problem List   Diagnosis    CKD (chronic kidney disease) stage 4, GFR 15-29 ml/min ([facility])    Encounter for long term current use of cyclophosphamide    Type 2 diabetes mellitus, with long-term current use of insulin ([facility])    Microcytic anemia    Thrombocytosis    Recurrent major depressive disorder, in remission ([facility] [address]    Eczema    OA (osteoarthritis)    Coronary artery disease involving native coronary artery without angina pectoris    Mixed hyperlipidemia    GERD (gastroesophageal reflux disease)    Peyronie disease    Erectile dysfunction    Granulomatosis with polyangiitis with renal involvement ([facility])    Benign prostatic hyperplasia without lower urinary tract symptoms    Allergic rhinitis    Osteoarthritis of carpometacarpal (CMC) joint of thumb    HTN (hypertension), benign     Current Outpatient Medications   Medication Sig    minoxidil (LONITEN) 10 MG tablet TAKE 1/2 (ONE-HALF) TABLET BY MOUTH IN THE EVENING    ezetimibe (ZETIA) 10 MG tablet TAKE 1 TABLET BY MOUTH ONCE DAILY    sevelamer hcl (RENAGEL) 800 MG tablet Take 1,600 mg by mouth 3 times daily (with meals)    sevelamer (RENVELA) 800 MG tablet TAKE 3 TABLETS BY MOUTH WITH MEALS THEN 1 WITH SNACKS    senna (SENOKOT) 8.6 MG tablet Take 8.6 mg by mouth as needed    predniSONE (DELTASONE) 20 MG tablet Take 80 mg by mouth daily    pantoprazole (PROTONIX) 40 MG tablet Take 40 mg by mouth 2 times daily    nitroGLYCERIN (NITROSTAT) 0.4 MG SL tablet Place 0.4 mg under the tongue every 5 minutes as needed    lisinopril (PRINIVIL;ZESTRIL) 2.5 MG tablet     gabapentin (NEURONTIN) 100 MG capsule TAKE 1 CAPSULE BY MOUTH EVERY OTHER DAY (DO NOT START BEFORE 4-22-22)    Evolocumab 140 MG/ML SOAJ Inject 140 mg into the skin every 14 days    Coenzyme Q10 300 MG CAPS Take 1 capsule by mouth daily    alirocumab (PRALUENT) 75 MG/ML SOAJ injection pen INJECT 75MG SUBCUTANEOUSLY EVERY 14 DAYS    amLODIPine (NORVASC) 10 MG tablet Take 10 mg by mouth daily    aspirin 81 MG EC tablet Take by mouth daily    budesonide (PULMICORT) 0.5 MG/2ML nebulizer suspension Inhale 500 mcg into the lungs 2 times daily    clobetasol (TEMOVATE) 0.05 % cream Apply to affected area bid prn for up to 2 weeks-avoid face and groin    diclofenac (FLECTOR) 1.3 % PTCH patch USE 1 TOPICALLY EVERY 12 HOURS    famotidine (PEPCID) 40 MG tablet Take 40 mg by mouth 2 times daily    finasteride (PROSCAR) 5 MG tablet Take 1 tablet by mouth once daily    furosemide (LASIX) 80 MG tablet Take 80 mg by mouth every other day    gabapentin (NEURONTIN) 300 MG capsule 1 capsule    nebivolol (BYSTOLIC) 10 MG tablet Take 1 tablet by mouth daily    ondansetron (ZOFRAN-ODT) 8 MG TBDP disintegrating tablet Take 8 mg by mouth every 8 hours as needed    PARoxetine (PAXIL) 30 MG tablet Take 30 mg by mouth daily    trimethoprim-polymyxin b (POLYTRIM) 03208-5.1 UNIT/ML-% ophthalmic solution 1 drop daily    traMADol (ULTRAM) 50 MG tablet Take 50 mg by mouth every 6 hours as needed.     triamcinolone acetonide (KENALOG) 0.1 % paste Place onto teeth 2 times daily     No current facility-administered medications for this visit. Past Medical History:   Diagnosis Date    Allergic rhinitis 2013    Benign prostatic hyperplasia without lower urinary tract symptoms 2016    CAD (coronary artery disease)     catherization    Chronic back pain     cervico-thoracic facet jt INJs done at [facility] pain clinic, last one 3/2019    Coronary artery disease involving native coronary artery without angina pectoris 10/05/2015    CCS 1821 in 7/15. LHC in 8/15 - sign obstructive dz in the LAD, stent placed. SUSANNE placed in RCA. He's unable to tolerate stains.  f/w Dr. Alda Olivares     Depression, prolonged 2015    Eczema 2014    GERD (gastroesophageal reflux disease)     Granulomatosis with polyangiitis ([facility] [address]     Hypertension     OA (osteoarthritis)     Peritoneal dialysis status ([facility] [address]     Peyronie disease 2013    Urethral stricture with dilation, managed by URO    Presence of stent in LAD coronary artery 2015    Presence of stent in right coronary artery 2015    8/18/15 Drug eluting stent placed    Sigmoid diverticulosis     mild    Urolithiasis     Dr Albarran Begin History     Tobacco Use    Smoking status: Never    Smokeless tobacco: Never   Substance Use Topics    Alcohol use: No     Past Surgical History:   Procedure Laterality Date    COLONOSCOPY N/A 2020    COLONOSCOPY/ 27 performed by Carol Dooley MD at [facility] ENDOSCOPY    GI      colonoscopy    ORTHOPEDIC SURGERY Left 2019    [facility] Arthroplasty    OTHER SURGICAL HISTORY  2021    catheter for HD    [place] CARDIAC SURG PROCEDURE UNLIST  2015    M LAD, Prox LAD, Distal RCA, LAD distal stents     Family History   Problem Relation Age of Onset    Heart Disease Other         GF- age 52    Elevated Lipids Father     Hypertension Father     Stroke Father     Other Mother         neurofibromatosis    Colon Cancer Mother          developed early 40s(NEUROFIBROMATOSIS)        ROS:    Review of Systems   Constitutional:  Negative for chills. Eyes:  Negative for pain. Respiratory:  Negative for cough and wheezing. Cardiovascular:  Negative for chest pain and palpitations. Gastrointestinal:  Negative for diarrhea and vomiting. Skin:  Negative for color change and wound. Allergic/Immunologic: Negative for environmental allergies. Neurological:  Negative for dizziness and headaches. PHYSICAL EXAM:    Resp 18   Ht 5' 8\" (1.727 m)   Wt 189 lb (85.7 kg)   BMI 28.74 kg/m²     General: NAD, well-appearing  Neuro: No gross neuro deficits. No facial weakness. Eyes: No periorbital edema/ecchymosis. No nystagmus. Skin: No facial erythema, rashes or concerning lesions. Nose: No external deviations or saddling. Intranasally, septum is midline without perforations, nasal mucosa appears healthy with no erythema, mucopurulence, or polyps. Mouth: Moist mucus membranes, normal tongue/palate mobility, no concerning mucosal lesions. Oropharynx clear with no erythema/exudate, no tonsillar hypertrophy. Ears: Normal appearing auricles, no hematomas. EACs clear with no cerumen impaction, healthy canal skin, RB tube in place on R side- well-seated and ME space is clear. L TM intact, clear ME space  Neck: Soft, supple, no palpable neck masses. No palpable parotid or submandibular masses. No thyromegaly or palpable thyroid nodules. Lymphatics: No palpable cervical LAD. Resp: No audible stridor or wheezing. CV: No murmurs, no JVD. Extremities: No clubbing or cyanosis. ASSESSMENT and PLAN      ICD-10-CM    1. Tympanostomy tube check  Z45.89       2. Granulomatosis with polyangiitis, unspecified whether renal involvement ([facility] [address]  M31.30           Doing better today from sinus and ear standpoint. His R tube is still in place and working well. There was less nasal crusting today and he feels better clinically. RTC in 6 mo for a tube check.     Donna Necessary Alan Silveira MD  7/15/2022    Electronically signed by Gomez Olson MD on 7/15/2022 at 10:09 AM

## 2022-09-15 ENCOUNTER — TELEPHONE (OUTPATIENT)
Dept: ENT CLINIC | Age: 57
End: 2022-09-15

## 2022-09-15 NOTE — TELEPHONE ENCOUNTER
Tried to call pt to discuss recent CT scan. Left message as there was no answer. I had spoken with Dr. Neal Nava, his Rheumatologist, earlier today. He has been having fever and bad sinus/head pressure and was seen in ED at [facility] and had CT scan which revealed acute sinusitis on R side and more chronic changes in L max sinus. Will try and get him appt in next wk or so to discuss scan. He may needs sinus washout in OR if he is not improving clinically.

## 2022-09-19 ENCOUNTER — OFFICE VISIT (OUTPATIENT)
Dept: ENT CLINIC | Age: 57
End: 2022-09-19
Payer: COMMERCIAL

## 2022-09-19 VITALS — HEIGHT: 68 IN | WEIGHT: 189 LBS | BODY MASS INDEX: 28.64 KG/M2 | RESPIRATION RATE: 18 BRPM

## 2022-09-19 DIAGNOSIS — H65.92 OME (OTITIS MEDIA WITH EFFUSION), LEFT: Primary | ICD-10-CM

## 2022-09-19 DIAGNOSIS — J32.0 CHRONIC MAXILLARY SINUSITIS: ICD-10-CM

## 2022-09-19 PROCEDURE — 69433 CREATE EARDRUM OPENING: CPT | Performed by: OTOLARYNGOLOGY

## 2022-09-19 PROCEDURE — G8419 CALC BMI OUT NRM PARAM NOF/U: HCPCS | Performed by: OTOLARYNGOLOGY

## 2022-09-19 PROCEDURE — G8428 CUR MEDS NOT DOCUMENT: HCPCS | Performed by: OTOLARYNGOLOGY

## 2022-09-19 PROCEDURE — 1036F TOBACCO NON-USER: CPT | Performed by: OTOLARYNGOLOGY

## 2022-09-19 PROCEDURE — 3017F COLORECTAL CA SCREEN DOC REV: CPT | Performed by: OTOLARYNGOLOGY

## 2022-09-19 PROCEDURE — 99214 OFFICE O/P EST MOD 30 MIN: CPT | Performed by: OTOLARYNGOLOGY

## 2022-09-19 RX ORDER — LEVOFLOXACIN 500 MG/1
500 TABLET, FILM COATED ORAL DAILY
Qty: 10 TABLET | Refills: 0 | Status: SHIPPED | OUTPATIENT
Start: 2022-09-19 | End: 2022-09-29

## 2022-09-19 ASSESSMENT — ENCOUNTER SYMPTOMS
VOMITING: 0
DIARRHEA: 0
EYE PAIN: 0
COLOR CHANGE: 0
COUGH: 0
WHEEZING: 0

## 2022-09-19 NOTE — PROGRESS NOTES
(LEVAQUIN) 500 MG tablet Take 1 tablet by mouth daily for 10 days    minoxidil (LONITEN) 10 MG tablet TAKE 1/2 (ONE-HALF) TABLET BY MOUTH IN THE EVENING    ezetimibe (ZETIA) 10 MG tablet TAKE 1 TABLET BY MOUTH ONCE DAILY    sevelamer hcl (RENAGEL) 800 MG tablet Take 1,600 mg by mouth 3 times daily (with meals)    sevelamer (RENVELA) 800 MG tablet TAKE 3 TABLETS BY MOUTH WITH MEALS THEN 1 WITH SNACKS    senna (SENOKOT) 8.6 MG tablet Take 8.6 mg by mouth as needed    predniSONE (DELTASONE) 20 MG tablet Take 80 mg by mouth daily    pantoprazole (PROTONIX) 40 MG tablet Take 40 mg by mouth 2 times daily    nitroGLYCERIN (NITROSTAT) 0.4 MG SL tablet Place 0.4 mg under the tongue every 5 minutes as needed    lisinopril (PRINIVIL;ZESTRIL) 2.5 MG tablet     gabapentin (NEURONTIN) 100 MG capsule TAKE 1 CAPSULE BY MOUTH EVERY OTHER DAY (DO NOT START BEFORE 4-22-22)    Evolocumab 140 MG/ML SOAJ Inject 140 mg into the skin every 14 days    Coenzyme Q10 300 MG CAPS Take 1 capsule by mouth daily    alirocumab (PRALUENT) 75 MG/ML SOAJ injection pen INJECT 75MG SUBCUTANEOUSLY EVERY 14 DAYS    amLODIPine (NORVASC) 10 MG tablet Take 10 mg by mouth daily    aspirin 81 MG EC tablet Take by mouth daily    budesonide (PULMICORT) 0.5 MG/2ML nebulizer suspension Inhale 500 mcg into the lungs 2 times daily    clobetasol (TEMOVATE) 0.05 % cream Apply to affected area bid prn for up to 2 weeks-avoid face and groin    diclofenac (FLECTOR) 1.3 % PTCH patch USE 1 TOPICALLY EVERY 12 HOURS    famotidine (PEPCID) 40 MG tablet Take 40 mg by mouth 2 times daily    finasteride (PROSCAR) 5 MG tablet Take 1 tablet by mouth once daily    furosemide (LASIX) 80 MG tablet Take 80 mg by mouth every other day    gabapentin (NEURONTIN) 300 MG capsule 1 capsule    nebivolol (BYSTOLIC) 10 MG tablet Take 1 tablet by mouth daily    ondansetron (ZOFRAN-ODT) 8 MG TBDP disintegrating tablet Take 8 mg by mouth every 8 hours as needed    PARoxetine (PAXIL) 30 MG tablet Take 30 mg by mouth daily    trimethoprim-polymyxin b (POLYTRIM) 97784-2.1 UNIT/ML-% ophthalmic solution 1 drop daily    traMADol (ULTRAM) 50 MG tablet Take 50 mg by mouth every 6 hours as needed. triamcinolone acetonide (KENALOG) 0.1 % paste Place onto teeth 2 times daily     No current facility-administered medications for this visit. Past Medical History:   Diagnosis Date    Allergic rhinitis 5/8/2013    Benign prostatic hyperplasia without lower urinary tract symptoms 6/22/2016    CAD (coronary artery disease)     catherization    Chronic back pain     cervico-thoracic facet jt INJs done at [facility] pain clinic, last one 3/2019    Coronary artery disease involving native coronary artery without angina pectoris 10/05/2015    CCS 1821 in 7/15. LHC in 8/15 - sign obstructive dz in the LAD, stent placed. SUSANNE placed in RCA. He's unable to tolerate stains.  f/w Dr. Daniella Henriquez     Depression, prolonged 12/21/2015    Eczema 1/6/2014    GERD (gastroesophageal reflux disease)     Granulomatosis with polyangiitis ([facility] [address] 2021    Hypertension     OA (osteoarthritis)     Peritoneal dialysis status ([facility] [address] 2021    Peyronie disease 5/8/2013    Urethral stricture with dilation, managed by URO    Presence of stent in LAD coronary artery 12/28/2015    Presence of stent in right coronary artery 12/28/2015    8/18/15 Drug eluting stent placed    Sigmoid diverticulosis 2020    mild    Urolithiasis     Dr Bella Springer History     Tobacco Use    Smoking status: Never    Smokeless tobacco: Never   Substance Use Topics    Alcohol use: No     Past Surgical History:   Procedure Laterality Date    COLONOSCOPY N/A 1/20/2020    COLONOSCOPY/ 27 performed by Del Santos MD at [facility] ENDOSCOPY    GI      colonoscopy    ORTHOPEDIC SURGERY Left 12/05/2019    [facility] Arthroplasty    OTHER SURGICAL HISTORY  07/09/2021    catheter for HD    [place] CARDIAC SURG PROCEDURE UNLIST  8/21/2015    M LAD, Prox LAD, Distal RCA, LAD distal stents     Family History   Problem Relation Age of Onset    Heart Disease Other         GF- age 52    Elevated Lipids Father     Hypertension Father     Stroke Father     Other Mother         neurofibromatosis    Colon Cancer Mother          developed early 40s(NEUROFIBROMATOSIS)        ROS:    Review of Systems   Constitutional:  Negative for chills. Eyes:  Negative for pain. Respiratory:  Negative for cough and wheezing. Cardiovascular:  Negative for chest pain and palpitations. Gastrointestinal:  Negative for diarrhea and vomiting. Skin:  Negative for color change and wound. Allergic/Immunologic: Negative for environmental allergies. Neurological:  Negative for dizziness and headaches. PHYSICAL EXAM:    Resp 18   Ht 5' 8\" (1.727 m)   Wt 189 lb (85.7 kg)   BMI 28.74 kg/m²     General: NAD, well-appearing  Neuro: No gross neuro deficits. No facial weakness. Eyes: No periorbital edema/ecchymosis. No nystagmus. Skin: No facial erythema, rashes or concerning lesions. Nose: No external deviations or saddling. Intranasally, septum is midline without perforations, changes c/w previous FESS- some discolored mucoid drainage bilaterally. No polyps seen. Mouth: Moist mucus membranes, normal tongue/palate mobility, no concerning mucosal lesions. Oropharynx clear with no erythema/exudate, no tonsillar hypertrophy. Ears: Normal appearing auricles, no hematomas. EACs clear with no cerumen impaction, healthy canal skin, patent tube in R ear w/ clear ME space. L TM intact but retracted and there is ISMAEL present. Neck: Soft, supple, no palpable neck masses. No palpable parotid or submandibular masses. No thyromegaly or palpable thyroid nodules. Lymphatics: No palpable cervical LAD. Resp: No audible stridor or wheezing. CV: No murmurs, no JVD. Extremities: No clubbing or cyanosis. PROCEDURE: L myringotomy w/ placement of tympanostomy tube  INDICATIONS: L OME  DESCRIPTION: Verbal consent obtained.  Timeout performed. After cleaning out L EAC, I anesthetized L TM w/ topical Phenol. Next, I used a myringotomy blade to make a radial incision along the anteroinferior quadrant of the TM and after suctioning out a serous middle ear effusion, a Angel-Bobbin tube was placed without difficulty. There was minimal bleeding and the patient tolerated the procedure well w/ no complications. IMAGING:  CT sinus- Rosalind- 9/6/22  FINDINGS:     Postsurgical changes of left maxillary antrostomy, infundibulectomy, and middle turbinectomy. Frothy secretions within the right maxillary sinus with a layering gas fluid level. Moderate mucosal thickening and secretions within the ethmoid air cells bilateral frontal sinuses. Complete consolidation and chronic wall thickening of the left maxillary sinus, consistent with chronic sinusitis. The sphenoid sinuses are predominantly clear. Thinning of the cribriform plate bilaterally. Mild leftward deviation of the bony nasal septum. No other significant anatomic variations are present. Left greater than right bilateral mastoid effusions. The orbits are unremarkable. Specifically, no subperiosteal abscess along the lamina papyracea. The visualized intracranial contents are unremarkable. IMPRESSION:       1. Findings suggesting acute sinusitis of the right maxillary sinus, ethmoid air cells, and right greater than left frontal sinuses without acute complication. 2.  Surgical changes of left maxillary antrostomy and infundibulectomy with redemonstrated sequela of chronic left maxillary sinusitis. ASSESSMENT and PLAN      ICD-10-CM    1. OME (otitis media with effusion), left  H65.92 CREATE EARDRUM OPENING,LOCAL ANESTH      2. Chronic maxillary sinusitis  J32.0           He has worsening L > R max sinusitis. He has had some relief on steroids, and I started him on Levaquin today.  His CT scan revealed completely opacified L max sinusitis and severe inflammation in R max sinus as well- it looks like he probably has a L max sinus fungal ball. At this time, I recommend proceeding w/ FESS w/ bilateral max antrostomies w/ tissue removal and ant ethmoidectomies. I discussed all the risks of surgery including bleeding, infection, continued sinonasal symptoms, CSF leak, damage to orbit w/ vision changes, and need for further procedures and they would like to proceed. I also placed a tube in his L ear today for persistent OME- he already had a tube in R ear.        Vania Wall MD  9/19/2022    Electronically signed by Vania Wall MD on 9/19/2022 at 4:06 PM

## 2022-09-28 DIAGNOSIS — G89.18 POST-OP PAIN: Primary | ICD-10-CM

## 2022-10-04 ENCOUNTER — TELEPHONE (OUTPATIENT)
Dept: ENT CLINIC | Age: 57
End: 2022-10-04

## 2022-10-04 RX ORDER — ONDANSETRON 4 MG/1
4 TABLET, FILM COATED ORAL 3 TIMES DAILY PRN
Qty: 15 TABLET | Refills: 0 | Status: SHIPPED | OUTPATIENT
Start: 2022-10-04

## 2022-10-04 RX ORDER — LEVOFLOXACIN 500 MG/1
500 TABLET, FILM COATED ORAL DAILY
Qty: 7 TABLET | Refills: 0 | Status: SHIPPED | OUTPATIENT
Start: 2022-10-04 | End: 2022-10-11

## 2022-10-04 RX ORDER — TRAMADOL HYDROCHLORIDE 50 MG/1
50 TABLET ORAL EVERY 4 HOURS PRN
Qty: 18 TABLET | Refills: 0 | Status: SHIPPED | OUTPATIENT
Start: 2022-10-04 | End: 2022-10-07

## 2022-10-04 NOTE — TELEPHONE ENCOUNTER
Patient's surgery on 10/06/2022 was cancelled at the [facility] due to: Anesthesia is cancelling [facility] for Thursday. The patient was just discharged from hospital Saturday October 1st with Sepsis or some type of infection. He will need to be rescheduled for at least four weeks out if not more and be done at the hospital due to several medical issues.

## 2022-10-05 ENCOUNTER — TELEPHONE (OUTPATIENT)
Dept: ENT CLINIC | Age: 57
End: 2022-10-05

## 2022-10-05 NOTE — TELEPHONE ENCOUNTER
Left message for patient to call back to reschedule surgery for 11/09 at [facility]. Dr. Shannon Fitzpatrick would like to see patient in the office in two weeks @ 10/17.

## 2022-10-10 ENCOUNTER — PREP FOR PROCEDURE (OUTPATIENT)
Dept: ENT CLINIC | Age: 57
End: 2022-10-10

## 2022-10-10 ENCOUNTER — OFFICE VISIT (OUTPATIENT)
Dept: ENT CLINIC | Age: 57
End: 2022-10-10
Payer: MEDICARE

## 2022-10-10 DIAGNOSIS — J32.0 CHRONIC MAXILLARY SINUSITIS: ICD-10-CM

## 2022-10-10 DIAGNOSIS — R05.3 CHRONIC COUGH: Primary | ICD-10-CM

## 2022-10-10 PROCEDURE — 1036F TOBACCO NON-USER: CPT | Performed by: OTOLARYNGOLOGY

## 2022-10-10 PROCEDURE — G8484 FLU IMMUNIZE NO ADMIN: HCPCS | Performed by: OTOLARYNGOLOGY

## 2022-10-10 PROCEDURE — 3017F COLORECTAL CA SCREEN DOC REV: CPT | Performed by: OTOLARYNGOLOGY

## 2022-10-10 PROCEDURE — G8428 CUR MEDS NOT DOCUMENT: HCPCS | Performed by: OTOLARYNGOLOGY

## 2022-10-10 PROCEDURE — G8419 CALC BMI OUT NRM PARAM NOF/U: HCPCS | Performed by: OTOLARYNGOLOGY

## 2022-10-10 PROCEDURE — 99213 OFFICE O/P EST LOW 20 MIN: CPT | Performed by: OTOLARYNGOLOGY

## 2022-10-10 RX ORDER — HYDROCODONE BITARTRATE AND HOMATROPINE METHYLBROMIDE ORAL SOLUTION 5; 1.5 MG/5ML; MG/5ML
5 LIQUID ORAL EVERY 6 HOURS PRN
Qty: 500 ML | Refills: 0 | Status: SHIPPED | OUTPATIENT
Start: 2022-10-10 | End: 2022-11-09

## 2022-10-10 ASSESSMENT — ENCOUNTER SYMPTOMS
EYE PAIN: 0
DIARRHEA: 0
WHEEZING: 0
COUGH: 0
VOMITING: 0
COLOR CHANGE: 0

## 2022-10-10 NOTE — PROGRESS NOTES
Chief Complaint   Patient presents with    Follow-up     Patient is here to get reevaluated for surgery and patient states that he has a cough and would like something for it. HPI:  Penny Anguiano III is a 64 y.o. male seen in follow-up for his sinuses. He had been on OR schedule last wk for FESS/wash-out but his case was cancelled as he had recently bee in hospital at [facility] for suspected sepsis. He was seeing his Rheum Dr. Javier Baron and he was very hypotensive and felt \"miserable. \" He continues to c/o significant max sinus pressure w/ pain extending up to his eyes. There have been worsened HA's as well and his cough has gotten much worse. Denies any dyspnea or hemoptysis. He has underlying Wegener's and has been on oral steroids per his Rheum and was discharged on Levaquin. He had another CT sinus while an inpatient. Past Medical History, Past Surgical History, Family history, Social History, and Medications were all reviewed with the patient today and updated as necessary.      Allergies   Allergen Reactions    Codeine Itching and Nausea And Vomiting    Penicillins Hives    Statins Other (See Comments)     Patient Active Problem List   Diagnosis    CKD (chronic kidney disease) stage 4, GFR 15-29 ml/min ([facility])    Encounter for long term current use of cyclophosphamide    Type 2 diabetes mellitus, with long-term current use of insulin ([facility])    Microcytic anemia    Thrombocytosis    Recurrent major depressive disorder, in remission ([facility]    Eczema    OA (osteoarthritis)    Coronary artery disease involving native coronary artery without angina pectoris    Mixed hyperlipidemia    GERD (gastroesophageal reflux disease)    Peyronie disease    Erectile dysfunction    Granulomatosis with polyangiitis with renal involvement ([facility])    Benign prostatic hyperplasia without lower urinary tract symptoms    Allergic rhinitis    Osteoarthritis of carpometacarpal (CMC) joint of thumb    HTN (hypertension), benign Chronic maxillary sinusitis     Current Outpatient Medications   Medication Sig    HYDROcodone homatropine (HYCODAN) 5-1.5 MG/5ML solution Take 5 mLs by mouth every 6 hours as needed (cough) for up to 30 days.     levoFLOXacin (LEVAQUIN) 500 MG tablet Take 1 tablet by mouth daily for 7 days    ondansetron (ZOFRAN) 4 MG tablet Take 1 tablet by mouth 3 times daily as needed for Nausea or Vomiting    minoxidil (LONITEN) 10 MG tablet TAKE 1/2 (ONE-HALF) TABLET BY MOUTH IN THE EVENING    ezetimibe (ZETIA) 10 MG tablet TAKE 1 TABLET BY MOUTH ONCE DAILY    sevelamer hcl (RENAGEL) 800 MG tablet Take 1,600 mg by mouth 3 times daily (with meals)    sevelamer (RENVELA) 800 MG tablet TAKE 3 TABLETS BY MOUTH WITH MEALS THEN 1 WITH SNACKS    senna (SENOKOT) 8.6 MG tablet Take 8.6 mg by mouth as needed    predniSONE (DELTASONE) 20 MG tablet Take 80 mg by mouth daily    pantoprazole (PROTONIX) 40 MG tablet Take 40 mg by mouth 2 times daily    nitroGLYCERIN (NITROSTAT) 0.4 MG SL tablet Place 0.4 mg under the tongue every 5 minutes as needed    lisinopril (PRINIVIL;ZESTRIL) 2.5 MG tablet     gabapentin (NEURONTIN) 100 MG capsule TAKE 1 CAPSULE BY MOUTH EVERY OTHER DAY (DO NOT START BEFORE 4-22-22)    Evolocumab 140 MG/ML SOAJ Inject 140 mg into the skin every 14 days    Coenzyme Q10 300 MG CAPS Take 1 capsule by mouth daily    alirocumab (PRALUENT) 75 MG/ML SOAJ injection pen INJECT 75MG SUBCUTANEOUSLY EVERY 14 DAYS    amLODIPine (NORVASC) 10 MG tablet Take 10 mg by mouth daily    aspirin 81 MG EC tablet Take by mouth daily    budesonide (PULMICORT) 0.5 MG/2ML nebulizer suspension Inhale 500 mcg into the lungs 2 times daily    clobetasol (TEMOVATE) 0.05 % cream Apply to affected area bid prn for up to 2 weeks-avoid face and groin    diclofenac (FLECTOR) 1.3 % PTCH patch USE 1 TOPICALLY EVERY 12 HOURS    famotidine (PEPCID) 40 MG tablet Take 40 mg by mouth 2 times daily    finasteride (PROSCAR) 5 MG tablet Take 1 tablet by mouth once daily    furosemide (LASIX) 80 MG tablet Take 80 mg by mouth every other day    gabapentin (NEURONTIN) 300 MG capsule 1 capsule    nebivolol (BYSTOLIC) 10 MG tablet Take 1 tablet by mouth daily    ondansetron (ZOFRAN-ODT) 8 MG TBDP disintegrating tablet Take 8 mg by mouth every 8 hours as needed    PARoxetine (PAXIL) 30 MG tablet Take 30 mg by mouth daily    trimethoprim-polymyxin b (POLYTRIM) 21375-4.1 UNIT/ML-% ophthalmic solution 1 drop daily    traMADol (ULTRAM) 50 MG tablet Take 50 mg by mouth every 6 hours as needed. triamcinolone acetonide (KENALOG) 0.1 % paste Place onto teeth 2 times daily     No current facility-administered medications for this visit. Past Medical History:   Diagnosis Date    Allergic rhinitis 5/8/2013    Benign prostatic hyperplasia without lower urinary tract symptoms 6/22/2016    CAD (coronary artery disease)     catherization    Chronic back pain     cervico-thoracic facet jt INJs done at [facility] pain clinic, last one 3/2019    Coronary artery disease involving native coronary artery without angina pectoris 10/05/2015    CCS 1821 in 7/15. LHC in 8/15 - sign obstructive dz in the LAD, stent placed. SUSANNE placed in RCA. He's unable to tolerate stains.  f/w Dr. Caty Fox     Depression, prolonged 12/21/2015    Eczema 1/6/2014    GERD (gastroesophageal reflux disease)     Granulomatosis with polyangiitis ([facility] 2021    Hypertension     OA (osteoarthritis)     Peritoneal dialysis status ([facility] 2021    Peyronie disease 5/8/2013    Urethral stricture with dilation, managed by URO    Presence of stent in LAD coronary artery 12/28/2015    Presence of stent in right coronary artery 12/28/2015    8/18/15 Drug eluting stent placed    Sigmoid diverticulosis 2020    mild    Urolithiasis     Dr Johnson Constant History     Tobacco Use    Smoking status: Never    Smokeless tobacco: Never   Substance Use Topics    Alcohol use: No     Past Surgical History:   Procedure Laterality Date    COLONOSCOPY N/A 2020    COLONOSCOPY/ 27 performed by Leonor Bennett MD at [facility] ENDOSCOPY    GI      colonoscopy    ORTHOPEDIC SURGERY Left 2019    [facility] Arthroplasty    OTHER SURGICAL HISTORY  2021    catheter for HD    [place] CARDIAC SURG PROCEDURE UNLIST  2015    M LAD, Prox LAD, Distal RCA, LAD distal stents     Family History   Problem Relation Age of Onset    Heart Disease Other         GF- age 52    Elevated Lipids Father     Hypertension Father     Stroke Father     Other Mother         neurofibromatosis    Colon Cancer Mother          developed early 40s(NEUROFIBROMATOSIS)        ROS:    Review of Systems   Constitutional:  Negative for chills. Eyes:  Negative for pain. Respiratory:  Negative for cough and wheezing. Cardiovascular:  Negative for chest pain and palpitations. Gastrointestinal:  Negative for diarrhea and vomiting. Skin:  Negative for color change and wound. Allergic/Immunologic: Negative for environmental allergies. Neurological:  Negative for dizziness and headaches. PHYSICAL EXAM:    There were no vitals taken for this visit. General: NAD, well-appearing  Neuro: No gross neuro deficits. No facial weakness. Eyes: No periorbital edema/ecchymosis. No nystagmus. Skin: No facial erythema, rashes or concerning lesions. Nose: No external deviations or saddling. Intranasally, septum is midline without perforations, changes c/w previous FESS, dried nasal mucosa and some crusting mainly on R side. Mouth: Moist mucus membranes, normal tongue/palate mobility, no concerning mucosal lesions. Oropharynx clear with no erythema/exudate, no tonsillar hypertrophy. Ears: Normal appearing auricles, no hematomas. EACs clear with no cerumen impaction, healthy canal skin, TM's intact with no perforations or retraction pockets. No middle ear effusions. Neck: Soft, supple, no palpable neck masses. No palpable parotid or submandibular masses.  No thyromegaly or palpable thyroid nodules. Lymphatics: No palpable cervical LAD. Resp: No audible stridor or wheezing. CV: No murmurs, no JVD. Extremities: No clubbing or cyanosis. ASSESSMENT and PLAN      ICD-10-CM    1. Chronic cough  R05.3 HYDROcodone homatropine (HYCODAN) 5-1.5 MG/5ML solution      2. Chronic maxillary sinusitis  J32.0         He is feeling slightly better after recent hospitalization and has no sxs of sepsis right now. His sinusitis has even worsened on more recent CT scan. He will continue Levaquin and oral steroids and irrigate his nose w/ NeilMed bottle. I also started him on cough syrupt today to help w/ the cough. Will get him back on surgery schedule at [facility] for FESS in next few wks.     Lianne Bhandari MD  10/11/2022    Electronically signed by Lianne Bhandari MD on 10/11/2022 at 7:27 AM

## 2022-10-12 ENCOUNTER — TELEMEDICINE (OUTPATIENT)
Dept: FAMILY MEDICINE CLINIC | Facility: CLINIC | Age: 57
End: 2022-10-12
Payer: MEDICARE

## 2022-10-12 DIAGNOSIS — N40.0 BENIGN PROSTATIC HYPERPLASIA WITHOUT LOWER URINARY TRACT SYMPTOMS: ICD-10-CM

## 2022-10-12 DIAGNOSIS — K21.9 GASTROESOPHAGEAL REFLUX DISEASE, UNSPECIFIED WHETHER ESOPHAGITIS PRESENT: ICD-10-CM

## 2022-10-12 DIAGNOSIS — L30.9 ECZEMA, UNSPECIFIED TYPE: Primary | ICD-10-CM

## 2022-10-12 DIAGNOSIS — F33.40 RECURRENT MAJOR DEPRESSIVE DISORDER, IN REMISSION (HCC): ICD-10-CM

## 2022-10-12 PROCEDURE — 99214 OFFICE O/P EST MOD 30 MIN: CPT | Performed by: FAMILY MEDICINE

## 2022-10-12 PROCEDURE — G8427 DOCREV CUR MEDS BY ELIG CLIN: HCPCS | Performed by: FAMILY MEDICINE

## 2022-10-12 PROCEDURE — 3017F COLORECTAL CA SCREEN DOC REV: CPT | Performed by: FAMILY MEDICINE

## 2022-10-12 RX ORDER — PAROXETINE 30 MG/1
30 TABLET, FILM COATED ORAL DAILY
Qty: 90 TABLET | Refills: 1 | Status: SHIPPED | OUTPATIENT
Start: 2022-10-12

## 2022-10-12 RX ORDER — FINASTERIDE 5 MG/1
5 TABLET, FILM COATED ORAL DAILY
Qty: 90 TABLET | Refills: 1 | Status: SHIPPED | OUTPATIENT
Start: 2022-10-12

## 2022-10-12 RX ORDER — CLOBETASOL PROPIONATE 0.5 MG/G
CREAM TOPICAL
Qty: 60 G | Refills: 5 | Status: SHIPPED | OUTPATIENT
Start: 2022-10-12

## 2022-10-12 RX ORDER — FAMOTIDINE 40 MG/1
40 TABLET, FILM COATED ORAL 2 TIMES DAILY
Qty: 180 TABLET | Refills: 1 | Status: SHIPPED | OUTPATIENT
Start: 2022-10-12

## 2022-10-12 RX ORDER — BENZONATATE 100 MG/1
CAPSULE ORAL
COMMUNITY
Start: 2022-09-30

## 2022-10-12 RX ORDER — FLUTICASONE PROPIONATE 50 MCG
SPRAY, SUSPENSION (ML) NASAL
COMMUNITY
Start: 2022-10-01

## 2022-10-12 NOTE — PROGRESS NOTES
[facility]  [address] [place] [address], [address] [place] [address]  Phone: [phone]  Fax: [phone]  [email or web address]      Encounter Raysa Shaikh III; Established patient 64 y.o.male; seen 10/12/2022 for: Follow-up, Hypertension, Gastroesophageal Reflux, and Medication Refill      Assessment & Plan    1. Eczema, unspecified type  -     clobetasol (TEMOVATE) 0.05 % cream; Apply to affected area bid prn for up to 2 weeks-avoid face and groin, Disp-60 g, R-5, Normal  2. Gastroesophageal reflux disease, unspecified whether esophagitis present  -     famotidine (PEPCID) 40 MG tablet; Take 1 tablet by mouth 2 times daily, Disp-180 tablet, R-1Normal  3. Benign prostatic hyperplasia without lower urinary tract symptoms  -     finasteride (PROSCAR) 5 MG tablet; Take 1 tablet by mouth daily, Disp-90 tablet, R-1Normal  4. Recurrent major depressive disorder, in remission (HCC)  -     PARoxetine (PAXIL) 30 MG tablet; Take 1 tablet by mouth daily, Disp-90 tablet, R-1Normal    Problem and/or Symptoms are currently stable and/or improving on current treatment plan. Will continue and have patient follow up as directed. Meds RF X 6 M      Check Out Instructions  Return in about 6 months (around 4/12/2023) for With PCP. Subjective & Objective    HPI  Patient states that chronic health problems are stable on current regimens and has no acute concerns today except as mentioned. Pt states that he has several specialists who manage all his other chronic health issues.      Review of Systems     Physical Exam  Patient-Reported Vitals 10/12/2022   Patient-Reported Weight 190   Patient-Reported Height 58   Patient-Reported Systolic 574   Patient-Reported Diastolic 81   Patient-Reported Pulse 89   Patient-Reported Temperature 97.3       BP Readings from Last 3 Encounters:   06/02/22 (!) 138/92   05/13/21 124/76     Wt Readings from Last 3 Encounters:   09/19/22 189 lb (85.7 kg)   07/15/22 189 lb (85.7 kg)   06/02/22 182 lb (82.6 kg)     There is no height or weight on file to calculate BMI. We discussed the typical prognosis and potential complications of the concern(s), including treatment options. Medication risks, benefits, costs, interactions, and alternatives were discussed as appropriate. I advised him to contact the office if his condition worsens or fails to improve as anticipated. He expressed understanding with the discussion and plan of care. [facility], was evaluated through a synchronous (real-time) audio and/or video encounter. The patient (or guardian if applicable) is aware that this is a billable service, which includes applicable co-pays. This Virtual Visit was conducted with patient's (and/or legal guardian's) consent. The visit was conducted pursuant to the emergency declaration under the [address] [place] [address], [address] [place] [address] [facility] authority and the [employer] Act. Patient identification was verified, and a caregiver was present when appropriate. The patient was located at Home: [place] [address]. Provider was located at [place] [address] (Appt Dept): [id number] [place] [address]. An electronic signature was used to authenticate this note.   -- Sarah Gomez MD

## 2022-10-24 DIAGNOSIS — G89.18 POST-OP PAIN: Primary | ICD-10-CM

## 2022-10-24 RX ORDER — HYDROCODONE BITARTRATE AND HOMATROPINE METHYLBROMIDE ORAL SOLUTION 5; 1.5 MG/5ML; MG/5ML
2.5 LIQUID ORAL 4 TIMES DAILY PRN
Qty: 250 ML | Refills: 0 | Status: SHIPPED | OUTPATIENT
Start: 2022-10-24 | End: 2022-10-31

## 2022-10-24 NOTE — TELEPHONE ENCOUNTER
Patient sent a [employer] message for a request a refill on hycodan. I spoke with Rodney Odonnell and he states that he will send in a refill for the pt.

## 2022-10-26 ENCOUNTER — PREP FOR PROCEDURE (OUTPATIENT)
Dept: ENT CLINIC | Age: 57
End: 2022-10-26

## 2022-10-26 DIAGNOSIS — J32.0 CHRONIC MAXILLARY SINUSITIS: Primary | ICD-10-CM

## 2022-10-27 NOTE — TELEPHONE ENCOUNTER
I called the pt to let him know that medication was already sent in to his pharmacy on file when his surgery was schedule last time. I left him a message and to give us a call if he has any questions or concerns.

## 2022-11-01 NOTE — PERIOP NOTE
3 attempts to reach pt for phone assessment were made and messages for return call left for pt.  No response from pt

## 2022-11-02 ENCOUNTER — ANESTHESIA EVENT (OUTPATIENT)
Dept: SURGERY | Age: 57
End: 2022-11-02
Payer: COMMERCIAL

## 2022-11-02 ENCOUNTER — HOSPITAL ENCOUNTER (OUTPATIENT)
Age: 57
Setting detail: OUTPATIENT SURGERY
Discharge: HOME | End: 2022-11-02
Attending: OTOLARYNGOLOGY | Admitting: OTOLARYNGOLOGY
Payer: COMMERCIAL

## 2022-11-02 ENCOUNTER — ANESTHESIA (OUTPATIENT)
Dept: SURGERY | Age: 57
End: 2022-11-02
Payer: COMMERCIAL

## 2022-11-02 VITALS
HEART RATE: 81 BPM | DIASTOLIC BLOOD PRESSURE: 77 MMHG | BODY MASS INDEX: 28.91 KG/M2 | WEIGHT: 195.2 LBS | SYSTOLIC BLOOD PRESSURE: 143 MMHG | HEIGHT: 69 IN | OXYGEN SATURATION: 95 % | TEMPERATURE: 97.4 F | RESPIRATION RATE: 14 BRPM

## 2022-11-02 DIAGNOSIS — J32.0 CHRONIC MAXILLARY SINUSITIS: ICD-10-CM

## 2022-11-02 PROBLEM — J32.2 CHRONIC ETHMOIDAL SINUSITIS: Status: ACTIVE | Noted: 2022-11-02

## 2022-11-02 PROBLEM — J32.3 CHRONIC SPHENOIDAL SINUSITIS: Status: ACTIVE | Noted: 2022-11-02

## 2022-11-02 LAB — POTASSIUM BLD-SCNC: 4.2 MMOL/L (ref 3.5–5.1)

## 2022-11-02 PROCEDURE — 3700000000 HC ANESTHESIA ATTENDED CARE: Performed by: OTOLARYNGOLOGY

## 2022-11-02 PROCEDURE — 2500000003 HC RX 250 WO HCPCS: Performed by: OTOLARYNGOLOGY

## 2022-11-02 PROCEDURE — 6360000002 HC RX W HCPCS: Performed by: OTOLARYNGOLOGY

## 2022-11-02 PROCEDURE — 3700000001 HC ADD 15 MINUTES (ANESTHESIA): Performed by: OTOLARYNGOLOGY

## 2022-11-02 PROCEDURE — 2709999900 HC NON-CHARGEABLE SUPPLY: Performed by: OTOLARYNGOLOGY

## 2022-11-02 PROCEDURE — 3600000014 HC SURGERY LEVEL 4 ADDTL 15MIN: Performed by: OTOLARYNGOLOGY

## 2022-11-02 PROCEDURE — 2720000010 HC SURG SUPPLY STERILE: Performed by: OTOLARYNGOLOGY

## 2022-11-02 PROCEDURE — 87075 CULTR BACTERIA EXCEPT BLOOD: CPT

## 2022-11-02 PROCEDURE — 2500000003 HC RX 250 WO HCPCS: Performed by: NURSE ANESTHETIST, CERTIFIED REGISTERED

## 2022-11-02 PROCEDURE — 7100000000 HC PACU RECOVERY - FIRST 15 MIN: Performed by: OTOLARYNGOLOGY

## 2022-11-02 PROCEDURE — 6360000002 HC RX W HCPCS: Performed by: ANESTHESIOLOGY

## 2022-11-02 PROCEDURE — 84132 ASSAY OF SERUM POTASSIUM: CPT

## 2022-11-02 PROCEDURE — 7100000011 HC PHASE II RECOVERY - ADDTL 15 MIN: Performed by: OTOLARYNGOLOGY

## 2022-11-02 PROCEDURE — 3600000004 HC SURGERY LEVEL 4 BASE: Performed by: OTOLARYNGOLOGY

## 2022-11-02 PROCEDURE — 6360000002 HC RX W HCPCS: Performed by: NURSE ANESTHETIST, CERTIFIED REGISTERED

## 2022-11-02 PROCEDURE — 7100000001 HC PACU RECOVERY - ADDTL 15 MIN: Performed by: OTOLARYNGOLOGY

## 2022-11-02 PROCEDURE — 87205 SMEAR GRAM STAIN: CPT

## 2022-11-02 PROCEDURE — 7100000010 HC PHASE II RECOVERY - FIRST 15 MIN: Performed by: OTOLARYNGOLOGY

## 2022-11-02 PROCEDURE — 6370000000 HC RX 637 (ALT 250 FOR IP): Performed by: ANESTHESIOLOGY

## 2022-11-02 PROCEDURE — 31255 NSL/SINS NDSC W/TOT ETHMDCT: CPT | Performed by: OTOLARYNGOLOGY

## 2022-11-02 PROCEDURE — 87176 TISSUE HOMOGENIZATION CULTR: CPT

## 2022-11-02 PROCEDURE — 31267 ENDOSCOPY MAXILLARY SINUS: CPT | Performed by: OTOLARYNGOLOGY

## 2022-11-02 PROCEDURE — 2580000003 HC RX 258: Performed by: ANESTHESIOLOGY

## 2022-11-02 PROCEDURE — 6370000000 HC RX 637 (ALT 250 FOR IP): Performed by: OTOLARYNGOLOGY

## 2022-11-02 RX ORDER — SODIUM CHLORIDE 9 MG/ML
INJECTION, SOLUTION INTRAVENOUS PRN
Status: DISCONTINUED | OUTPATIENT
Start: 2022-11-02 | End: 2022-11-02 | Stop reason: HOSPADM

## 2022-11-02 RX ORDER — MIDAZOLAM HYDROCHLORIDE 2 MG/2ML
2 INJECTION, SOLUTION INTRAMUSCULAR; INTRAVENOUS
Status: COMPLETED | OUTPATIENT
Start: 2022-11-02 | End: 2022-11-02

## 2022-11-02 RX ORDER — HYDROMORPHONE HYDROCHLORIDE 1 MG/ML
0.5 INJECTION, SOLUTION INTRAMUSCULAR; INTRAVENOUS; SUBCUTANEOUS EVERY 5 MIN PRN
Status: DISCONTINUED | OUTPATIENT
Start: 2022-11-02 | End: 2022-11-02 | Stop reason: HOSPADM

## 2022-11-02 RX ORDER — LIDOCAINE HYDROCHLORIDE 20 MG/ML
INJECTION, SOLUTION EPIDURAL; INFILTRATION; INTRACAUDAL; PERINEURAL PRN
Status: DISCONTINUED | OUTPATIENT
Start: 2022-11-02 | End: 2022-11-02 | Stop reason: SDUPTHER

## 2022-11-02 RX ORDER — SODIUM CHLORIDE 0.9 % (FLUSH) 0.9 %
5-40 SYRINGE (ML) INJECTION PRN
Status: DISCONTINUED | OUTPATIENT
Start: 2022-11-02 | End: 2022-11-02 | Stop reason: HOSPADM

## 2022-11-02 RX ORDER — METHYLPREDNISOLONE SODIUM SUCCINATE 125 MG/2ML
INJECTION, POWDER, LYOPHILIZED, FOR SOLUTION INTRAMUSCULAR; INTRAVENOUS PRN
Status: DISCONTINUED | OUTPATIENT
Start: 2022-11-02 | End: 2022-11-02 | Stop reason: SDUPTHER

## 2022-11-02 RX ORDER — GLYCOPYRROLATE 0.2 MG/ML
INJECTION INTRAMUSCULAR; INTRAVENOUS PRN
Status: DISCONTINUED | OUTPATIENT
Start: 2022-11-02 | End: 2022-11-02 | Stop reason: SDUPTHER

## 2022-11-02 RX ORDER — OXYCODONE HYDROCHLORIDE 5 MG/1
10 TABLET ORAL PRN
Status: COMPLETED | OUTPATIENT
Start: 2022-11-02 | End: 2022-11-02

## 2022-11-02 RX ORDER — TRIAMCINOLONE ACETONIDE 40 MG/ML
INJECTION, SUSPENSION INTRA-ARTICULAR; INTRAMUSCULAR PRN
Status: DISCONTINUED | OUTPATIENT
Start: 2022-11-02 | End: 2022-11-02 | Stop reason: HOSPADM

## 2022-11-02 RX ORDER — ONDANSETRON 2 MG/ML
INJECTION INTRAMUSCULAR; INTRAVENOUS PRN
Status: DISCONTINUED | OUTPATIENT
Start: 2022-11-02 | End: 2022-11-02 | Stop reason: SDUPTHER

## 2022-11-02 RX ORDER — SODIUM CHLORIDE, SODIUM LACTATE, POTASSIUM CHLORIDE, CALCIUM CHLORIDE 600; 310; 30; 20 MG/100ML; MG/100ML; MG/100ML; MG/100ML
100 INJECTION, SOLUTION INTRAVENOUS CONTINUOUS
Status: DISCONTINUED | OUTPATIENT
Start: 2022-11-02 | End: 2022-11-02 | Stop reason: HOSPADM

## 2022-11-02 RX ORDER — LIDOCAINE HYDROCHLORIDE AND EPINEPHRINE 20; 5 MG/ML; UG/ML
INJECTION, SOLUTION EPIDURAL; INFILTRATION; INTRACAUDAL; PERINEURAL PRN
Status: DISCONTINUED | OUTPATIENT
Start: 2022-11-02 | End: 2022-11-02 | Stop reason: HOSPADM

## 2022-11-02 RX ORDER — FENTANYL CITRATE 50 UG/ML
50 INJECTION, SOLUTION INTRAMUSCULAR; INTRAVENOUS PRN
Status: DISCONTINUED | OUTPATIENT
Start: 2022-11-02 | End: 2022-11-02 | Stop reason: HOSPADM

## 2022-11-02 RX ORDER — LIDOCAINE HYDROCHLORIDE 10 MG/ML
1 INJECTION, SOLUTION INFILTRATION; PERINEURAL
Status: DISCONTINUED | OUTPATIENT
Start: 2022-11-02 | End: 2022-11-02 | Stop reason: HOSPADM

## 2022-11-02 RX ORDER — ROCURONIUM BROMIDE 10 MG/ML
INJECTION, SOLUTION INTRAVENOUS PRN
Status: DISCONTINUED | OUTPATIENT
Start: 2022-11-02 | End: 2022-11-02 | Stop reason: SDUPTHER

## 2022-11-02 RX ORDER — ONDANSETRON 2 MG/ML
4 INJECTION INTRAMUSCULAR; INTRAVENOUS
Status: DISCONTINUED | OUTPATIENT
Start: 2022-11-02 | End: 2022-11-02 | Stop reason: HOSPADM

## 2022-11-02 RX ORDER — SODIUM CHLORIDE 0.9 % (FLUSH) 0.9 %
5-40 SYRINGE (ML) INJECTION EVERY 12 HOURS SCHEDULED
Status: DISCONTINUED | OUTPATIENT
Start: 2022-11-02 | End: 2022-11-02 | Stop reason: HOSPADM

## 2022-11-02 RX ORDER — CEFAZOLIN SODIUM 1 G/3ML
INJECTION, POWDER, FOR SOLUTION INTRAMUSCULAR; INTRAVENOUS PRN
Status: DISCONTINUED | OUTPATIENT
Start: 2022-11-02 | End: 2022-11-02 | Stop reason: SDUPTHER

## 2022-11-02 RX ORDER — VANCOMYCIN HYDROCHLORIDE 1 G/20ML
INJECTION, POWDER, LYOPHILIZED, FOR SOLUTION INTRAVENOUS PRN
Status: DISCONTINUED | OUTPATIENT
Start: 2022-11-02 | End: 2022-11-02 | Stop reason: HOSPADM

## 2022-11-02 RX ORDER — NEOSTIGMINE METHYLSULFATE 1 MG/ML
INJECTION, SOLUTION INTRAVENOUS PRN
Status: DISCONTINUED | OUTPATIENT
Start: 2022-11-02 | End: 2022-11-02 | Stop reason: SDUPTHER

## 2022-11-02 RX ORDER — OXYMETAZOLINE HYDROCHLORIDE 0.05 G/100ML
SPRAY NASAL PRN
Status: DISCONTINUED | OUTPATIENT
Start: 2022-11-02 | End: 2022-11-02 | Stop reason: HOSPADM

## 2022-11-02 RX ORDER — FENTANYL CITRATE 50 UG/ML
100 INJECTION, SOLUTION INTRAMUSCULAR; INTRAVENOUS PRN
Status: DISCONTINUED | OUTPATIENT
Start: 2022-11-02 | End: 2022-11-02 | Stop reason: HOSPADM

## 2022-11-02 RX ORDER — PROPOFOL 10 MG/ML
INJECTION, EMULSION INTRAVENOUS PRN
Status: DISCONTINUED | OUTPATIENT
Start: 2022-11-02 | End: 2022-11-02 | Stop reason: SDUPTHER

## 2022-11-02 RX ORDER — FENTANYL CITRATE 50 UG/ML
INJECTION, SOLUTION INTRAMUSCULAR; INTRAVENOUS PRN
Status: DISCONTINUED | OUTPATIENT
Start: 2022-11-02 | End: 2022-11-02 | Stop reason: SDUPTHER

## 2022-11-02 RX ORDER — OXYCODONE HYDROCHLORIDE 5 MG/1
5 TABLET ORAL PRN
Status: COMPLETED | OUTPATIENT
Start: 2022-11-02 | End: 2022-11-02

## 2022-11-02 RX ORDER — SODIUM CHLORIDE, SODIUM LACTATE, POTASSIUM CHLORIDE, CALCIUM CHLORIDE 600; 310; 30; 20 MG/100ML; MG/100ML; MG/100ML; MG/100ML
INJECTION, SOLUTION INTRAVENOUS CONTINUOUS
Status: DISCONTINUED | OUTPATIENT
Start: 2022-11-02 | End: 2022-11-02 | Stop reason: HOSPADM

## 2022-11-02 RX ADMIN — ROCURONIUM BROMIDE 50 MG: 50 INJECTION, SOLUTION INTRAVENOUS at 09:38

## 2022-11-02 RX ADMIN — Medication 3 MG: at 10:56

## 2022-11-02 RX ADMIN — PHENYLEPHRINE HYDROCHLORIDE 50 MCG: 0.1 INJECTION, SOLUTION INTRAVENOUS at 10:29

## 2022-11-02 RX ADMIN — PHENYLEPHRINE HYDROCHLORIDE 50 MCG: 0.1 INJECTION, SOLUTION INTRAVENOUS at 10:45

## 2022-11-02 RX ADMIN — PHENYLEPHRINE HYDROCHLORIDE 50 MCG: 0.1 INJECTION, SOLUTION INTRAVENOUS at 10:42

## 2022-11-02 RX ADMIN — CEFAZOLIN 2 G: 1 INJECTION, POWDER, FOR SOLUTION INTRAMUSCULAR; INTRAVENOUS at 09:50

## 2022-11-02 RX ADMIN — MIDAZOLAM HYDROCHLORIDE 2 MG: 2 INJECTION, SOLUTION INTRAMUSCULAR; INTRAVENOUS at 09:26

## 2022-11-02 RX ADMIN — ONDANSETRON 4 MG: 2 INJECTION INTRAMUSCULAR; INTRAVENOUS at 10:10

## 2022-11-02 RX ADMIN — METHYLPREDNISOLONE SODIUM SUCCINATE 125 MG: 125 INJECTION, POWDER, FOR SOLUTION INTRAMUSCULAR; INTRAVENOUS at 09:56

## 2022-11-02 RX ADMIN — FENTANYL CITRATE 100 MCG: 50 INJECTION, SOLUTION INTRAMUSCULAR; INTRAVENOUS at 09:38

## 2022-11-02 RX ADMIN — GLYCOPYRROLATE 0.4 MG: 0.2 INJECTION, SOLUTION INTRAMUSCULAR; INTRAVENOUS at 10:56

## 2022-11-02 RX ADMIN — PROPOFOL 180 MG: 10 INJECTION, EMULSION INTRAVENOUS at 09:38

## 2022-11-02 RX ADMIN — PHENYLEPHRINE HYDROCHLORIDE 50 MCG: 0.1 INJECTION, SOLUTION INTRAVENOUS at 10:50

## 2022-11-02 RX ADMIN — PHENYLEPHRINE HYDROCHLORIDE 50 MCG: 0.1 INJECTION, SOLUTION INTRAVENOUS at 09:59

## 2022-11-02 RX ADMIN — SODIUM CHLORIDE: 9 INJECTION, SOLUTION INTRAVENOUS at 09:17

## 2022-11-02 RX ADMIN — PHENYLEPHRINE HYDROCHLORIDE 50 MCG: 0.1 INJECTION, SOLUTION INTRAVENOUS at 09:55

## 2022-11-02 RX ADMIN — PHENYLEPHRINE HYDROCHLORIDE 50 MCG: 0.1 INJECTION, SOLUTION INTRAVENOUS at 10:18

## 2022-11-02 RX ADMIN — OXYCODONE 10 MG: 5 TABLET ORAL at 11:18

## 2022-11-02 RX ADMIN — LIDOCAINE HYDROCHLORIDE 100 MG: 20 INJECTION, SOLUTION EPIDURAL; INFILTRATION; INTRACAUDAL; PERINEURAL at 09:38

## 2022-11-02 ASSESSMENT — PAIN DESCRIPTION - LOCATION: LOCATION: NOSE

## 2022-11-02 ASSESSMENT — PAIN - FUNCTIONAL ASSESSMENT: PAIN_FUNCTIONAL_ASSESSMENT: 0-10

## 2022-11-02 NOTE — DISCHARGE INSTRUCTIONS
-Please start irrigating your nose/sinuses 3-4 times daily w/ NeilMed sinus irrigation bottle. There will be some large mucus crusts and blood clots when you irrigate for the first couple of days.  -There will be some bleeding from nose for first 2-3 days. Please change out the nasal gauze pad several times daily. You may also place a bag of ice or frozen peas across the nose to help slow down the bleeding.   -No nose blowing as this will increase the risk of bleeding.  -No strenuous activity of heavy lifting for 1 wk after surgery.  -There will be a temporary alteration in sense of smell and taste after surgery- this is normal and expected.   Instructions Following Ambulatory Surgery    Activity   As tolerated and directed by your doctor   Bathe or shower as directed by your doctor    Diet   Clear liquids until no nausea or vomiting; then light diet for the first day   Advance to regular diet on second day, unless your doctor orders otherwise   If nausea and vomiting continues, call your doctor    Pain   Take pain medication as directed by your doctor    Call your doctor if pain is NOT relieved by medication   DO NOT take aspirin or blood thinners until directed by your doctor    Follow-Up Phone Calls   Will be made nursing staff   If you have any problems, call your doctor as needed    Call your doctor if   Excessive bleeding that does not stop after holding mild pressure over the area   Temperature of 101 degrees F or above   Redness,excessive swelling or bruising, and/or green or yellow, smelly discharge from incision    After Anesthesia   For the first 24 hours: DO NOT Drive, Drink alcoholic beverages, or Make important decisions   Be aware of dizziness following anesthesia and while taking pain medication

## 2022-11-02 NOTE — H&P
63 yo male w/ GPA and known CRS- s/p s/p previous FESS 2 yrs ago for a fungal ball. He has continued to have chronic sinonasal sxs w/ significant max sinus pressure w/ pain extending up to his eyes. There have been worsened HA's as well and his cough has gotten much worse. Denies any dyspnea or hemoptysis. He has underlying Wegener's and has been on oral steroids per his Rheum. Past Medical History:   Diagnosis Date    Allergic rhinitis 5/8/2013    Benign prostatic hyperplasia without lower urinary tract symptoms 6/22/2016    CAD (coronary artery disease)     catherization    Chronic back pain     cervico-thoracic facet jt INJs done at [facility] pain clinic, last one 3/2019    Coronary artery disease involving native coronary artery without angina pectoris 10/05/2015    CCS 1821 in 7/15. LHC in 8/15 - sign obstructive dz in the LAD, stent placed. SUSANNE placed in RCA. He's unable to tolerate stains.  f/w Dr. Cervantes Pall     Depression, prolonged 12/21/2015    Eczema 1/6/2014    GERD (gastroesophageal reflux disease)     Granulomatosis with polyangiitis ([facility] 2021    Hypertension     OA (osteoarthritis)     Peritoneal dialysis status ([facility] 2021    Peyronie disease 5/8/2013    Urethral stricture with dilation, managed by URO    Presence of stent in LAD coronary artery 12/28/2015    Presence of stent in right coronary artery 12/28/2015    8/18/15 Drug eluting stent placed    Sigmoid diverticulosis 2020    mild    Urolithiasis     Dr All Azar     Past Surgical History:   Procedure Laterality Date    COLONOSCOPY N/A 1/20/2020    COLONOSCOPY/ 27 performed by Leonor Bennett MD at [address] [place] [address] Dr ramirez    ORTHOPEDIC SURGERY Left 12/05/2019    [facility] Arthroplasty    OTHER SURGICAL HISTORY  07/09/2021    catheter for HD    [place] CARDIAC SURG PROCEDURE UNLIST  8/21/2015    M LAD, Prox LAD, Distal RCA, LAD distal stents     Social History     Socioeconomic History    Marital status:      Spouse name: Not on file Number of children: Not on file    Years of education: Not on file    Highest education level: Not on file   Occupational History    Not on file   Tobacco Use    Smoking status: Never    Smokeless tobacco: Never   Substance and Sexual Activity    Alcohol use: No    Drug use: No    Sexual activity: Not on file   Other Topics Concern    Not on file   Social History Narrative     to      Social Determinants of Health     Financial Resource Strain: Not on file   Food Insecurity: Not on file   Transportation Needs: Not on file   Physical Activity: Not on file   Stress: Not on file   Social Connections: Not on file   Intimate Partner Violence: Not on file   Housing Stability: Not on file     Family History   Problem Relation Age of Onset    Heart Disease Other         GF- age 52    Elevated Lipids Father     Hypertension Father     Stroke Father     Other Mother         neurofibromatosis    Colon Cancer Mother          developed early 40s(NEUROFIBROMATOSIS)         Allergies   Allergen Reactions    Codeine Itching and Nausea And Vomiting    Penicillins Hives    Statins Other (See Comments)     No current facility-administered medications on file prior to encounter. Current Outpatient Medications on File Prior to Encounter   Medication Sig Dispense Refill    HYDROcodone homatropine (HYCODAN) 5-1.5 MG/5ML solution Take 5 mLs by mouth every 6 hours as needed (cough) for up to 30 days.  500 mL 0    ondansetron (ZOFRAN) 4 MG tablet Take 1 tablet by mouth 3 times daily as needed for Nausea or Vomiting (Patient not taking: Reported on 2022) 15 tablet 0    minoxidil (LONITEN) 10 MG tablet TAKE 1/2 (ONE-HALF) TABLET BY MOUTH IN THE EVENING      ezetimibe (ZETIA) 10 MG tablet TAKE 1 TABLET BY MOUTH ONCE DAILY      sevelamer hcl (RENAGEL) 800 MG tablet Take 1,600 mg by mouth 3 times daily (with meals)      sevelamer (RENVELA) 800 MG tablet TAKE 3 TABLETS BY MOUTH WITH MEALS THEN 1 WITH SNACKS      senna (SENOKOT) 8.6 MG tablet Take 8.6 mg by mouth as needed      predniSONE (DELTASONE) 20 MG tablet Take 80 mg by mouth daily      pantoprazole (PROTONIX) 40 MG tablet Take 40 mg by mouth 2 times daily      nitroGLYCERIN (NITROSTAT) 0.4 MG SL tablet Place 0.4 mg under the tongue every 5 minutes as needed      Evolocumab 140 MG/ML SOAJ Inject 140 mg into the skin every 14 days      Coenzyme Q10 300 MG CAPS Take 1 capsule by mouth daily      alirocumab (PRALUENT) 75 MG/ML SOAJ injection pen INJECT 75MG SUBCUTANEOUSLY EVERY 14 DAYS      amLODIPine (NORVASC) 10 MG tablet Take 10 mg by mouth daily      aspirin 81 MG EC tablet Take by mouth daily      budesonide (PULMICORT) 0.5 MG/2ML nebulizer suspension Inhale 500 mcg into the lungs 2 times daily      furosemide (LASIX) 80 MG tablet Take 80 mg by mouth every other day      gabapentin (NEURONTIN) 300 MG capsule 1 capsule      nebivolol (BYSTOLIC) 10 MG tablet Take 1 tablet by mouth daily      ondansetron (ZOFRAN-ODT) 8 MG TBDP disintegrating tablet Take 8 mg by mouth every 8 hours as needed      trimethoprim-polymyxin b (POLYTRIM) 39458-8.1 UNIT/ML-% ophthalmic solution 1 drop daily      traMADol (ULTRAM) 50 MG tablet Take 50 mg by mouth every 6 hours as needed. triamcinolone acetonide (KENALOG) 0.1 % paste Place onto teeth 2 times daily       EXAM:  BP (!) 140/81   Pulse 87   Temp 97.3 °F (36.3 °C) (Temporal)   Resp 16   Ht 5' 9\" (1.753 m)   Wt 195 lb 3.2 oz (88.5 kg)   SpO2 97%   BMI 28.83 kg/m²   General: NAD, well-appearing  Neuro: No gross neuro deficits. No facial weakness. Eyes: No periorbital edema/ecchymosis. No nystagmus. Skin: No facial erythema, rashes or concerning lesions. Nose: No external deviations or saddling. Intranasally, septum is midline without perforations, changes c/w previous FESS, dried nasal mucosa and some crusting mainly on R side. Mouth: Moist mucus membranes, normal tongue/palate mobility, no concerning mucosal lesions. Oropharynx clear with no erythema/exudate, no tonsillar hypertrophy. Ears: Normal appearing auricles, no hematomas. EACs clear with no cerumen impaction, healthy canal skin, TM's intact with no perforations or retraction pockets. No middle ear effusions. Neck: Soft, supple, no palpable neck masses. No palpable parotid or submandibular masses. No thyromegaly or palpable thyroid nodules. Lymphatics: No palpable cervical LAD. Resp: No audible stridor or wheezing. CV: No murmurs, no JVD. Extremities: No clubbing or cyanosis    A/P:  He is feeling slightly better after recent hospitalization and has no sxs of sepsis right now. His sinusitis has even worsened on more recent CT scan. At this time, I recommend proceeding w/ bilateral FESS w/ sinus washout. I discussed all the risks of surgery including bleeding, infection, continued sinonasal symptoms, CSF leak, damage to orbit w/ vision changes, and need for further procedures and they would like to proceed.        [address]

## 2022-11-02 NOTE — BRIEF OP NOTE
Brief Postoperative Note      Patient: Alicia Arreola III  YOB: 1965  MRN: [id number]    Date of Procedure: 11/2/2022    Pre-Op Diagnosis: Chronic pansinusitis    Post-Op Diagnosis: Chronic pansinusitis       Procedure(s):  SINUS ENDOSCOPY FUNCTIONAL SURGERY Bilateral Maxillary Antrostomies, Bilateral Anterior Ethmoidectomies, sphenoidotomies    Surgeon(s):  Lianne Bhandari MD    Assistant:  * No surgical staff found *    Anesthesia: General    Estimated Blood Loss (mL): 50 cc    IVF: 808 cc    Complications: None    Specimens:   ID Type Source Tests Collected by Time Destination   1 : Right Nasal Culture Tissue Nose CULTURE, ANAEROBIC, CULTURE, TISSUE Lianne Bhandari MD 11/2/2022 8854        Implants:  * No implants in log *      Drains: * No LDAs found *    Findings: thick debris bilaterally, scarring within both Mms, acute purulence in R max and sphenoid sinuses    Electronically signed by Lianne Bhandari MD on 11/2/2022 at 10:54 AM

## 2022-11-02 NOTE — ANESTHESIA POSTPROCEDURE EVALUATION
Department of Anesthesiology  Postprocedure Note    Patient: Ariadne Robledo III  MRN: [id number]  YOB: 1965  Date of evaluation: 11/2/2022      Procedure Summary     Date: 11/02/22 Room / Location: [facility] MAIN OR 02 / [facility] MAIN OR    Anesthesia Start: 6953 Anesthesia Stop: 1107    Procedure: SINUS ENDOSCOPY FUNCTIONAL SURGERY Bilateral Maxillary Antrostomies, Bilateral Anterior Ethmoidectomies (Bilateral: Nose) Diagnosis:       Chronic maxillary sinusitis      (Chronic maxillary sinusitis [J32.0])    Surgeons: Apolonia Vera MD Responsible Provider: Blade Schneider MD    Anesthesia Type: general ASA Status: 3          Anesthesia Type: No value filed.     Allison Phase I: Allison Score: 10    Allison Phase II: Allison Score: 10      Anesthesia Post Evaluation    Patient location during evaluation: PACU  Patient participation: complete - patient participated  Level of consciousness: awake and alert  Pain score: 2  Airway patency: patent  Nausea & Vomiting: no nausea and no vomiting  Complications: no  Cardiovascular status: blood pressure returned to baseline  Respiratory status: acceptable  Hydration status: euvolemic  Comments: BP (!) 143/77   Pulse 81   Temp 97.4 °F (36.3 °C) (Temporal)   Resp 14   Ht 5' 9\" (1.753 m)   Wt 195 lb 3.2 oz (88.5 kg)   SpO2 95%   BMI 28.83 kg/m²   Multimodal analgesia pain management approach

## 2022-11-02 NOTE — ANESTHESIA PRE PROCEDURE
Department of Anesthesiology  Preprocedure Note       Name:  Seth Simple III   Age:  64 y.o.  :  1965                                          MRN:  [id number]         Date:  2022      Surgeon: Jose J Gipson):  Jackie Fu MD    Procedure: Procedure(s):  SINUS ENDOSCOPY FUNCTIONAL SURGERY Bilateral Maxillary Antrostomies, Bilateral Anterior Ethmoidectomies    Medications prior to admission:   Prior to Admission medications    Medication Sig Start Date End Date Taking? Authorizing Provider   sodium chloride (AYR) 0.65 % SOLN nasal drops USE 2 SPRAY(S) IN EACH NOSTRIL TWICE DAILY 10/1/22   Historical Provider, MD   benzonatate (TESSALON) 100 MG capsule TAKE 1 CAPSULE BY MOUTH THREE TIMES DAILY AS NEEDED FOR COUGH 22   Historical Provider, MD   fluticasone (FLONASE) 50 MCG/ACT nasal spray USE 2 SPRAY(S) IN EACH NOSTRIL ONCE DAILY START 10/2/22 10/1/22   Historical Provider, MD   clobetasol (TEMOVATE) 0.05 % cream Apply to affected area bid prn for up to 2 weeks-avoid face and groin 10/12/22   Verenice Phelan MD   famotidine (PEPCID) 40 MG tablet Take 1 tablet by mouth 2 times daily 10/12/22   Verenice Phelan MD   PARoxetine (PAXIL) 30 MG tablet Take 1 tablet by mouth daily 10/12/22   Verenice Phelan MD   finasteride (PROSCAR) 5 MG tablet Take 1 tablet by mouth daily 10/12/22   Verenice Phelan MD   HYDROcodone homatropine (HYCODAN) 5-1.5 MG/5ML solution Take 5 mLs by mouth every 6 hours as needed (cough) for up to 30 days.  10/10/22 11/9/22  Jackie Fu MD   ondansetron (ZOFRAN) 4 MG tablet Take 1 tablet by mouth 3 times daily as needed for Nausea or Vomiting 10/4/22   Jackie Fu MD   minoxidil (LONITEN) 10 MG tablet TAKE 1/2 (ONE-HALF) TABLET BY MOUTH IN THE EVENING 22   Historical Provider, MD   ezetimibe (ZETIA) 10 MG tablet TAKE 1 TABLET BY MOUTH ONCE DAILY 22   Historical Provider, MD   sevelamer hcl (RENAGEL) 800 MG tablet Take 1,600 mg by mouth 3 times daily (with meals)    Historical Provider, MD   sevelamer (RENVELA) 800 MG tablet TAKE 3 TABLETS BY MOUTH WITH MEALS THEN 1 WITH SNACKS 5/20/22   Historical Provider, MD   senna (SENOKOT) 8.6 MG tablet Take 8.6 mg by mouth as needed    Historical Provider, MD   predniSONE (DELTASONE) 20 MG tablet Take 80 mg by mouth daily 5/3/22   Historical Provider, MD   pantoprazole (PROTONIX) 40 MG tablet Take 40 mg by mouth 2 times daily 5/3/22   Historical Provider, MD   nitroGLYCERIN (NITROSTAT) 0.4 MG SL tablet Place 0.4 mg under the tongue every 5 minutes as needed 8/17/15   Historical Provider, MD   lisinopril (PRINIVIL;ZESTRIL) 2.5 MG tablet  12/14/20   Historical Provider, MD   Evolocumab 140 MG/ML SOAJ Inject 140 mg into the skin every 14 days 4/4/22 3/30/23  Historical Provider, MD   Coenzyme Q10 300 MG CAPS Take 1 capsule by mouth daily    Historical Provider, MD   alirocumab (PRALUENT) 75 MG/ML SOAJ injection pen INJECT 75MG SUBCUTANEOUSLY EVERY 14 DAYS 12/10/20   Historical Provider, MD   amLODIPine (NORVASC) 10 MG tablet Take 10 mg by mouth daily 1/20/22   Ar Automatic Reconciliation   aspirin 81 MG EC tablet Take by mouth daily    Ar Automatic Reconciliation   budesonide (PULMICORT) 0.5 MG/2ML nebulizer suspension Inhale 500 mcg into the lungs 2 times daily 7/8/21   Ar Automatic Reconciliation   furosemide (LASIX) 80 MG tablet Take 80 mg by mouth every other day 9/21/21   Ar Automatic Reconciliation   gabapentin (NEURONTIN) 300 MG capsule 1 capsule    Ar Automatic Reconciliation   nebivolol (BYSTOLIC) 10 MG tablet Take 1 tablet by mouth daily    Ar Automatic Reconciliation   ondansetron (ZOFRAN-ODT) 8 MG TBDP disintegrating tablet Take 8 mg by mouth every 8 hours as needed 7/8/21   Ar Automatic Reconciliation   trimethoprim-polymyxin b (POLYTRIM) 69397-9.1 UNIT/ML-% ophthalmic solution 1 drop daily    Ar Automatic Reconciliation   traMADol (ULTRAM) 50 MG tablet Take 50 mg by mouth every 6 hours as needed.     Ar Automatic Reconciliation   triamcinolone acetonide (KENALOG) 0.1 % paste Place onto teeth 2 times daily    Ar Automatic Reconciliation       Current medications:    No current facility-administered medications for this encounter. Allergies: Allergies   Allergen Reactions    Codeine Itching and Nausea And Vomiting    Penicillins Hives    Statins Other (See Comments)       Problem List:    Patient Active Problem List   Diagnosis Code    CKD (chronic kidney disease) stage 4, GFR 15-29 ml/min ([facility]) N18.4    Encounter for long term current use of cyclophosphamide Z79.630    Type 2 diabetes mellitus, with long-term current use of insulin ([facility]) E11.9, Z79.4    Microcytic anemia D50.9    Thrombocytosis D75.839    Recurrent major depressive disorder, in remission ([facility] F33.40    Eczema L30.9    OA (osteoarthritis) M19.90    Coronary artery disease involving native coronary artery without angina pectoris I25.10    Mixed hyperlipidemia E78.2    GERD (gastroesophageal reflux disease) K21.9    Peyronie disease N48.6    Erectile dysfunction N52.9    Granulomatosis with polyangiitis with renal involvement ([facility]) M31.31    Benign prostatic hyperplasia without lower urinary tract symptoms N40.0    Allergic rhinitis J30.9    Osteoarthritis of carpometacarpal (CMC) joint of thumb M18.9    HTN (hypertension), benign I10    Chronic maxillary sinusitis J32.0       Past Medical History:        Diagnosis Date    Allergic rhinitis 5/8/2013    Benign prostatic hyperplasia without lower urinary tract symptoms 6/22/2016    CAD (coronary artery disease)     catherization    Chronic back pain     cervico-thoracic facet jt INJs done at [facility], last one 3/2019    Coronary artery disease involving native coronary artery without angina pectoris 10/05/2015    CCS 1821 in 7/15. LHC in 8/15 - sign obstructive dz in the LAD, stent placed. SUSANNE placed in RCA. He's unable to tolerate stains.  f/w Dr. Mane Arriaza  Depression, prolonged 12/21/2015    Eczema 1/6/2014    GERD (gastroesophageal reflux disease)     Granulomatosis with polyangiitis ([facility] 2021    Hypertension     OA (osteoarthritis)     Peritoneal dialysis status ([facility] 2021    Peyronie disease 5/8/2013    Urethral stricture with dilation, managed by URO    Presence of stent in LAD coronary artery 12/28/2015    Presence of stent in right coronary artery 12/28/2015    8/18/15 Drug eluting stent placed    Sigmoid diverticulosis 2020    mild    Urolithiasis     Dr Jorge Olsen       Past Surgical History:        Procedure Laterality Date    COLONOSCOPY N/A 1/20/2020    COLONOSCOPY/ 27 performed by Julisa Dick [facility] at [facility]      colonoscopy    ORTHOPEDIC SURGERY Left 12/05/2019    [facility] Arthroplasty    OTHER SURGICAL HISTORY  07/09/2021    catheter for HD    [place] CARDIAC SURG PROCEDURE UNLIST  8/21/2015    M LAD, Prox LAD, Distal RCA, LAD distal stents       Social History:    Social History     Tobacco Use    Smoking status: Never    Smokeless tobacco: Never   Substance Use Topics    Alcohol use: No                                Counseling given: Not Answered      Vital Signs (Current): There were no vitals filed for this visit.                                            BP Readings from Last 3 Encounters:   06/02/22 (!) 138/92   05/13/21 124/76       NPO Status:                                                                                 BMI:   Wt Readings from Last 3 Encounters:   09/19/22 189 lb (85.7 kg)   07/15/22 189 lb (85.7 kg)   06/02/22 182 lb (82.6 kg)     There is no height or weight on file to calculate BMI.    CBC:   Lab Results   Component Value Date/Time    WBC 6.3 03/01/2021 09:08 AM    RBC 4.12 03/01/2021 09:08 AM    HGB 11.2 03/01/2021 09:08 AM    HCT 34.0 03/01/2021 09:08 AM    MCV 83 03/01/2021 09:08 AM    RDW 13.5 03/01/2021 09:08 AM     03/01/2021 09:08 AM       CMP:   Lab Results   Component Value Date/Time     03/01/2021 09:08 AM    K 4.3 03/01/2021 09:08 AM     03/01/2021 09:08 AM    CO2 25 03/01/2021 09:08 AM    BUN 14 03/01/2021 09:08 AM    CREATININE 0.76 03/01/2021 09:08 AM    GFRAA 119 03/01/2021 09:08 AM    AGRATIO 1.3 03/01/2021 09:08 AM    GLUCOSE 107 03/01/2021 09:08 AM    PROT 6.6 03/01/2021 09:08 AM    CALCIUM 8.7 03/01/2021 09:08 AM    BILITOT 0.5 03/01/2021 09:08 AM    ALKPHOS 182 03/01/2021 09:08 AM    AST 29 03/01/2021 09:08 AM    ALT 22 03/01/2021 09:08 AM       POC Tests: No results for input(s): POCGLU, POCNA, POCK, POCCL, POCBUN, POCHEMO, POCHCT in the last 72 hours. Coags: No results found for: PROTIME, INR, APTT    HCG (If Applicable): No results found for: PREGTESTUR, PREGSERUM, HCG, HCGQUANT     ABGs: No results found for: PHART, PO2ART, JNA2GCZ, BSV7VNF, BEART, H6WDCUIG     Type & Screen (If Applicable):  No results found for: LABABO, LABRH    Drug/Infectious Status (If Applicable):  No results found for: HIV, HEPCAB    COVID-19 Screening (If Applicable): No results found for: COVID19        Anesthesia Evaluation  Patient summary reviewed  Airway: Mallampati: II  TM distance: >3 FB   Neck ROM: full  Mouth opening: > = 3 FB   Dental:          Pulmonary:Negative Pulmonary ROS and normal exam                               Cardiovascular:  Exercise tolerance: good (>4 METS),   (+) hypertension: mild, CAD: no interval change, CABG/stent (SUSANNE 2015): no interval change,                   Neuro/Psych:   Negative Neuro/Psych ROS              GI/Hepatic/Renal:   (+) GERD: well controlled, renal disease (PD dialysis): CRI, ESRD and dialysis,           Endo/Other:    (+) DiabetesType II DM, well controlled, , blood dyscrasia (Hb 9.8): anemia:., .                 Abdominal:             Vascular: negative vascular ROS. Other Findings:           Anesthesia Plan      general     ASA 3       Induction: intravenous.       Anesthetic plan and risks discussed with patient and spouse.                         Antonella Frias MD   11/2/2022

## 2022-11-02 NOTE — OP NOTE
[place]  OPERATIVE REPORT    Name:  Sebastián Fitzpatrick  MR#:  [id number]  :  1965  ACCOUNT #:  [de-identified]  DATE OF SERVICE:  2022    PREOPERATIVE DIAGNOSIS:  Chronic sinusitis. POSTOPERATIVE DIAGNOSIS:  Chronic sinusitis. PROCEDURE PERFORMED:  Revision functional endoscopic sinus surgery with:  A.  Bilateral maxillary antrostomies with tissue removal.  B.  Bilateral total ethmoidectomies. C.  Bilateral sphenoidotomies. SURGEON:  Zulema Akers. Rox Mcmillan MD    ASSISTANT:  none. ANESTHESIA:  General endotracheal.    ANESTHESIOLOGIST:  Kristofer Smith MD    COMPLICATIONS:  None. SPECIMENS REMOVED:  Left nasal culture - permanent. IMPLANTS:  none. ESTIMATED BLOOD LOSS:  50 mL. OPERATIVE FINDINGS:  1. There was significant scar tissue within both sides with scar bands between the left inferior turbinate and nasal septum, as well as within both middle meatuses. 2.  There was acute mucopurulence filling the entire right maxillary and right sphenoid sinuses. 3.  There was thick green mucus crusting along the floor of both nasal cavities. 4.  Nasopore soaked in Kenalog was placed bilaterally. IV FLUIDS:  300 mL crystalloid. DRAINS:  None. DISPOSITION:  PACU, then home. CONDITION:  Stable. BRIEF HISTORY:  The patient is a 40-year-old male with history of Wegener's granulomatosis and chronic pansinusitis. He has had two previous sinus surgeries for what sounds like a fungus ball of the maxillary sinus. He presented to my office with continued sinonasal complaints including thick nasal drainage, worsening facial pressure and headaches. He had a CT scan which revealed worsening sinusitis mainly within the maxillary and ethmoid sinuses bilaterally. The decision was made to take him to the operating room for revision functional endoscopic sinus surgery.     DESCRIPTION OF PROCEDURE:  The patient was brought back to the operating room and placed on the table in the supine position. General endotracheal anesthesia was inducted without any complications. Once the patient was adequately sedated, nasal pledgets soaked in Afrin were placed on both nasal cavities for topical decongestion. He was then sterilely prepped and draped in the usual fashion. I began by removing the nasal pledgets and using a 0-degree endoscope to visualize both sides. On the right, there was thick green mucus crusting along the nasal floor extending all the way back to the nasopharynx. This was debrided and irrigated out. There was significant scarring within the middle meatus limiting my view. On the left side, there was less crusting overall, but a scar band anteriorly between the inferior turbinate and the septum which had to be taken down sharply using a Avant elevator. There was more mucoid debris posteriorly in the nasal cavity which was cultured. There was also significant scarring within the left middle meatus. I began on the right side. I used a Avant elevator to dissect through some scar bands between the turbinate and the lateral nasal sidewall. I then was able to medialize the middle turbinate. I used a microdebrider to begin dissecting out some polypoid disease within the anterior ethmoid cells. There was no maxillary antrostomy present. I used a blunt maxillary seeker and probed along the lateral nasal sidewall until I identified the natural ostium of the maxillary sinus. As I entered into the right maxillary sinus, there was thick mucopurulent drainage. I used the microdebrider and the backbiter to enlarge the natural ostium of the right maxillary sinus in all four directions. At this point, I switched to a 30-degree scope for better visualization laterally. I used several different curved instrumentation to debride all the hyperplastic mucosa within the right maxillary sinus. I then switched back to a 0-degree scope and started the ethmoidectomy.   I had debrided all the polyps within the anterior ethmoids, but then I dissected past the basal lamella of the middle turbinate into the posterior ethmoids. There was thick mucopurulent drainage emanating out of the sphenoethmoid recess. I identified the superior turbinate and resected the inferior portion of it to better expose this region. I identified the natural ostium of the sphenoid sinus which was patent, but there was thick purulence emanating from this sinus. I used the microdebrider and enlarged the natural ostium within all four directions. I then suctioned and irrigated out the right sphenoid sinus which was again filled with acute mucopurulence. Finally, I worked my way from posterior to anterior along the skull base, opening up all residual ethmoid septations. I irrigated out the right sinus cavities with copious vancomycin saline irrigation and then packed it off with nasal pledgets soaked in Afrin for hemostasis. A similar procedure was performed on the left side. I had previously taken down the scar band between the inferior turbinate and the septum for better visualization. The middle meatus was almost completely scarred off with polypoid disease. I used the microdebrider to begin debulking this polyp disease from anterior to posterior. As on the right side, there was no visible maxillary antrostomy. I probed for the natural ostium using a blunt maxillary seeker and then I enlarged the natural ostium in all four directions using the microdebrider. There was a very hypoplastic left maxillary sinus which was filled with very hyperplastic mucosa. This was debrided using a curved instrumentation under better visualization using a 30-degree endoscope. I then switched back to a 0-degree scope and continued with an ethmoidectomy.   I dissected out the ethmoid bulla from medial to lateral and then dissected superiorly up towards the frontal sinus outflow tract, opening several more superior anterior ethmoid cells.  I then dissected past the basal lamella of the middle turbinate into the posterior ethmoid cells which appeared undissected. I identified the superior turbinate and initially dissected lateral to the superior turbinate, opening up the rest of the posterior ethmoid cells, which were very polypoid in nature. I then resected the inferior portion of the superior turbinate to better expose the sphenoethmoid recess. I identified the natural ostium of the sphenoid sinus, which was then enlarged in all four directions using the microdebrider. Finally, I worked my way from posterior to anterior along the skull base, removing any residual ethmoid septations. I irrigated out the left sinus cavities with copious vancomycin saline irrigation and then packed it off with nasal pledgets soaked in Afrin for hemostasis. I removed all the nasal pledgets and revisualized both sides. Once hemostasis was ensured, half piece of NasoPore soaked in Kenalog was placed within both middle meatuses. I then suctioned out the patient's stomach. He was turned back to the anesthesia team, extubated and taken to the PACU in stable condition afterwards.       Genevieve Allred MD      [id number]  D:  11/02/2022 11:23  T:  11/02/2022 16:07  JOB #:  [id number]

## 2022-11-04 LAB
BACTERIA SPEC CULT: NORMAL
GRAM STN SPEC: NORMAL
GRAM STN SPEC: NORMAL
SERVICE CMNT-IMP: NORMAL

## 2022-11-11 ENCOUNTER — OFFICE VISIT (OUTPATIENT)
Dept: ENT CLINIC | Age: 57
End: 2022-11-11
Payer: COMMERCIAL

## 2022-11-11 VITALS — HEIGHT: 69 IN | RESPIRATION RATE: 17 BRPM | WEIGHT: 195 LBS | BODY MASS INDEX: 28.88 KG/M2

## 2022-11-11 DIAGNOSIS — J32.4 CHRONIC PANSINUSITIS: Primary | ICD-10-CM

## 2022-11-11 LAB
BACTERIA SPEC CULT: NORMAL
SERVICE CMNT-IMP: NORMAL

## 2022-11-11 PROCEDURE — 31237 NSL/SINS NDSC SURG BX POLYPC: CPT | Performed by: OTOLARYNGOLOGY

## 2022-11-11 PROCEDURE — 3017F COLORECTAL CA SCREEN DOC REV: CPT | Performed by: OTOLARYNGOLOGY

## 2022-11-11 PROCEDURE — G8484 FLU IMMUNIZE NO ADMIN: HCPCS | Performed by: OTOLARYNGOLOGY

## 2022-11-11 PROCEDURE — G8428 CUR MEDS NOT DOCUMENT: HCPCS | Performed by: OTOLARYNGOLOGY

## 2022-11-11 PROCEDURE — 1036F TOBACCO NON-USER: CPT | Performed by: OTOLARYNGOLOGY

## 2022-11-11 PROCEDURE — G8419 CALC BMI OUT NRM PARAM NOF/U: HCPCS | Performed by: OTOLARYNGOLOGY

## 2022-11-11 NOTE — PROGRESS NOTES
Chief Complaint   Patient presents with    Post-Op Check     Post op recheck of FESS done at [facility] on 11/2/22. HPI:  Seth Kaur III is a 64 y.o. male seen now 1 week postop after undergoing FESS back on 11/2/2022. Doing much better since surgery with greatly improved airflow bilaterally. He has been irrigating with salt water and had has been getting out some large mucoid crusts, but no further blood clots. He feels much less pressure overall across his face since surgery. He does not need any more pain medication. Past Medical History, Past Surgical History, Family history, Social History, and Medications were all reviewed with the patient today and updated as necessary.      Allergies   Allergen Reactions    Codeine Itching and Nausea And Vomiting    Penicillins Hives    Statins Other (See Comments)     Patient Active Problem List   Diagnosis    CKD (chronic kidney disease) stage 4, GFR 15-29 ml/min ([facility])    Encounter for long term current use of cyclophosphamide    Type 2 diabetes mellitus, with long-term current use of insulin ([facility])    Microcytic anemia    Thrombocytosis    Recurrent major depressive disorder, in remission ([facility]    Eczema    OA (osteoarthritis)    Coronary artery disease involving native coronary artery without angina pectoris    Mixed hyperlipidemia    GERD (gastroesophageal reflux disease)    Peyronie disease    Erectile dysfunction    Granulomatosis with polyangiitis with renal involvement ([facility])    Benign prostatic hyperplasia without lower urinary tract symptoms    Allergic rhinitis    Osteoarthritis of carpometacarpal (CMC) joint of thumb    HTN (hypertension), benign    Chronic maxillary sinusitis    Chronic ethmoidal sinusitis    Chronic sphenoidal sinusitis     Current Outpatient Medications   Medication Sig    sodium chloride (AYR) 0.65 % SOLN nasal drops USE 2 SPRAY(S) IN EACH NOSTRIL TWICE DAILY    benzonatate (TESSALON) 100 MG capsule TAKE 1 CAPSULE BY MOUTH THREE TIMES DAILY AS NEEDED FOR COUGH    fluticasone (FLONASE) 50 MCG/ACT nasal spray USE 2 SPRAY(S) IN EACH NOSTRIL ONCE DAILY START 10/2/22    clobetasol (TEMOVATE) 0.05 % cream Apply to affected area bid prn for up to 2 weeks-avoid face and groin    famotidine (PEPCID) 40 MG tablet Take 1 tablet by mouth 2 times daily    PARoxetine (PAXIL) 30 MG tablet Take 1 tablet by mouth daily    finasteride (PROSCAR) 5 MG tablet Take 1 tablet by mouth daily    ondansetron (ZOFRAN) 4 MG tablet Take 1 tablet by mouth 3 times daily as needed for Nausea or Vomiting    minoxidil (LONITEN) 10 MG tablet TAKE 1/2 (ONE-HALF) TABLET BY MOUTH IN THE EVENING    ezetimibe (ZETIA) 10 MG tablet TAKE 1 TABLET BY MOUTH ONCE DAILY    sevelamer hcl (RENAGEL) 800 MG tablet Take 1,600 mg by mouth 3 times daily (with meals)    sevelamer (RENVELA) 800 MG tablet TAKE 3 TABLETS BY MOUTH WITH MEALS THEN 1 WITH SNACKS    senna (SENOKOT) 8.6 MG tablet Take 8.6 mg by mouth as needed    predniSONE (DELTASONE) 20 MG tablet Take 80 mg by mouth daily    pantoprazole (PROTONIX) 40 MG tablet Take 40 mg by mouth 2 times daily    nitroGLYCERIN (NITROSTAT) 0.4 MG SL tablet Place 0.4 mg under the tongue every 5 minutes as needed    Evolocumab 140 MG/ML SOAJ Inject 140 mg into the skin every 14 days    Coenzyme Q10 300 MG CAPS Take 1 capsule by mouth daily    alirocumab (PRALUENT) 75 MG/ML SOAJ injection pen INJECT 75MG SUBCUTANEOUSLY EVERY 14 DAYS    amLODIPine (NORVASC) 10 MG tablet Take 10 mg by mouth daily    aspirin 81 MG EC tablet Take by mouth daily    budesonide (PULMICORT) 0.5 MG/2ML nebulizer suspension Inhale 500 mcg into the lungs 2 times daily    furosemide (LASIX) 80 MG tablet Take 80 mg by mouth every other day    gabapentin (NEURONTIN) 300 MG capsule 1 capsule    nebivolol (BYSTOLIC) 10 MG tablet Take 1 tablet by mouth daily    ondansetron (ZOFRAN-ODT) 8 MG TBDP disintegrating tablet Take 8 mg by mouth every 8 hours as needed trimethoprim-polymyxin b (POLYTRIM) 05926-7.1 UNIT/ML-% ophthalmic solution 1 drop daily    traMADol (ULTRAM) 50 MG tablet Take 50 mg by mouth every 6 hours as needed. triamcinolone acetonide (KENALOG) 0.1 % paste Place onto teeth 2 times daily     No current facility-administered medications for this visit. Past Medical History:   Diagnosis Date    Allergic rhinitis 5/8/2013    Benign prostatic hyperplasia without lower urinary tract symptoms 6/22/2016    CAD (coronary artery disease)     catherization    Chronic back pain     cervico-thoracic facet jt INJs done at [facility] pain clinic, last one 3/2019    Chronic pansinusitis     Coronary artery disease involving native coronary artery without angina pectoris 10/05/2015    CCS 1821 in 7/15. LHC in 8/15 - sign obstructive dz in the LAD, stent placed. SUSANNE placed in RCA. He's unable to tolerate stains.  f/w Dr. Humberto Amato     Depression, prolonged 12/21/2015    Eczema 1/6/2014    GERD (gastroesophageal reflux disease)     Granulomatosis with polyangiitis ([facility] [address] 2021    Hypertension     OA (osteoarthritis)     Peritoneal dialysis status ([facility] 2021    Peyronie disease 5/8/2013    Urethral stricture with dilation, managed by URO    Presence of stent in LAD coronary artery 12/28/2015    Presence of stent in right coronary artery 12/28/2015    8/18/15 Drug eluting stent placed    Sigmoid diverticulosis 2020    mild    Urolithiasis     Dr Abad Newberry History     Tobacco Use    Smoking status: Never    Smokeless tobacco: Never   Substance Use Topics    Alcohol use: No     Past Surgical History:   Procedure Laterality Date    COLONOSCOPY N/A 1/20/2020    COLONOSCOPY/ 27 performed by Marv Enamorado MD at [facility] ENDOSCOPY    GI      colonoscopy    ORTHOPEDIC SURGERY Left 12/05/2019    [facility] Arthroplasty    OTHER SURGICAL HISTORY  07/09/2021    catheter for HD    RI CARDIAC SURG PROCEDURE UNLIST  8/21/2015    M LAD, Prox LAD, Distal RCA, LAD distal stents    SINUS ENDOSCOPY Bilateral 2022    SINUS ENDOSCOPY FUNCTIONAL SURGERY Bilateral Maxillary Antrostomies, Bilateral Anterior Ethmoidectomies performed by Marylin Harkins MD at [address] Bilateral 2022    revision FESS- Frances Melsharee     Family History   Problem Relation Age of Onset    Heart Disease Other         GF- age 52    Elevated Lipids Father     Hypertension Father     Stroke Father     Other Mother         neurofibromatosis    Colon Cancer Mother          developed early 40s(NEUROFIBROMATOSIS)        ROS:    Review of Systems   Constitutional:  Negative for fever. HENT:  Positive for sore throat. Negative for congestion. Eyes:  Negative for visual disturbance. Respiratory:  Negative for shortness of breath. Cardiovascular:  Negative for chest pain. Gastrointestinal:  Negative for abdominal pain. Skin:  Negative for rash. Neurological:  Negative for dizziness and headaches. Hematological:  Negative for adenopathy. Psychiatric/Behavioral:  Negative for agitation. PHYSICAL EXAM:    Resp 17   Ht 5' 9\" (1.753 m)   Wt 195 lb (88.5 kg)   BMI 28.80 kg/m²     General: NAD, well-appearing  Neuro: No gross neuro deficits. No facial weakness. Eyes: No periorbital edema/ecchymosis. No nystagmus. Skin: No facial erythema, rashes or concerning lesions. Nose: No external deviations or saddling. Intranasally, septum is midline without perforations, changes c/w previous FESS- endoscopy recommended  Mouth: Moist mucus membranes, normal tongue/palate mobility, no concerning mucosal lesions. Oropharynx clear with no erythema/exudate, no tonsillar hypertrophy. Ears: Normal appearing auricles, no hematomas. EACs clear with no cerumen impaction, healthy canal skin, TM's intact with no perforations or retraction pockets. No middle ear effusions. Neck: Soft, supple, no palpable neck masses. No palpable parotid or submandibular masses. No thyromegaly or palpable thyroid nodules. Lymphatics: No palpable cervical LAD. Resp: No audible stridor or wheezing. CV: No murmurs, no JVD. Extremities: No clubbing or cyanosis. PROCEDURE: Nasal endoscopy w/ post-op debridement  INDICATIONS: Debridement s/p FESS  DESCRIPTION: After verbal consent was obtained and a timeout was performed, the 0 degree rigid nasal endoscope was advanced into both nares. The septum was midline w/ no perforations or hematomas. The turbinates were intact and there was residual crusting and Nasopore within both middle meatuses. I suctioned and debrided both sides, removing several large mucoid crusts and blood clots. All sinus ostia are patent and healing well. No polyps/purulence. The scope was then removed and the patient tolerated the procedure well w/ no complications. ASSESSMENT and PLAN      ICD-10-CM    1. Chronic pansinusitis  J32.4 NE NASAL SCOPE,BX/RMV POLYP/DEBRID        He is doing great after his revision FESS last week. I scoped him today and did a routine post-op sinus debridement and everything seems to be healing up well and he has much better airflow bilaterally. I started him on topical budesonide irrigations through [employer] and we will see him back in 1 month for another sinus check.     Julio Cesar Holden MD  11/12/2022    Electronically signed by Julio Cesar Holden MD on 11/12/2022 at 8:43 AM

## 2022-11-12 ASSESSMENT — ENCOUNTER SYMPTOMS
SORE THROAT: 1
SHORTNESS OF BREATH: 0
ABDOMINAL PAIN: 0

## 2022-12-16 ENCOUNTER — OFFICE VISIT (OUTPATIENT)
Dept: ENT CLINIC | Age: 57
End: 2022-12-16

## 2022-12-16 VITALS — WEIGHT: 195 LBS | RESPIRATION RATE: 18 BRPM | BODY MASS INDEX: 28.88 KG/M2 | HEIGHT: 69 IN

## 2022-12-16 DIAGNOSIS — M31.30 GRANULOMATOSIS WITH POLYANGIITIS, UNSPECIFIED WHETHER RENAL INVOLVEMENT (HCC): ICD-10-CM

## 2022-12-16 DIAGNOSIS — J32.4 CHRONIC PANSINUSITIS: Primary | ICD-10-CM

## 2022-12-16 RX ORDER — GABAPENTIN 600 MG/1
TABLET ORAL
COMMUNITY
Start: 2022-11-15

## 2022-12-16 ASSESSMENT — ENCOUNTER SYMPTOMS
COUGH: 0
DIARRHEA: 0
EYE PAIN: 0
COLOR CHANGE: 0
WHEEZING: 0
VOMITING: 0

## 2022-12-16 NOTE — PROGRESS NOTES
Chief Complaint   Patient presents with    Follow-up     Patient is here for his follow up and states that he is doing fine. HPI:  Ronnie Hebert III is a 62 y.o. male seen in follow-up for now 1 month out from his revision FESS back on 11/2/2022. He has underlying Wegener's granulomatosis. Doing well overall since surgery with improved airflow bilaterally. He has been irrigating daily with budesonide, and there can be some yellow drainage in the irrigate. He continues to have an intermittent right periorbital headache, but denies any maxillary or frontal sinus pressure today. His headache is typically worse in the mornings. He has done well from a pulmonary standpoint with no shortness of breath or hemoptysis. Past Medical History, Past Surgical History, Family history, Social History, and Medications were all reviewed with the patient today and updated as necessary.      Allergies   Allergen Reactions    Codeine Itching and Nausea And Vomiting    Penicillins Hives    Statins Other (See Comments)     Patient Active Problem List   Diagnosis    CKD (chronic kidney disease) stage 4, GFR 15-29 ml/min ([facility])    Encounter for long term current use of cyclophosphamide    Type 2 diabetes mellitus, with long-term current use of insulin ([facility])    Microcytic anemia    Thrombocytosis    Recurrent major depressive disorder, in remission ([facility] [address]    Eczema    OA (osteoarthritis)    Coronary artery disease involving native coronary artery without angina pectoris    Mixed hyperlipidemia    GERD (gastroesophageal reflux disease)    Peyronie disease    Erectile dysfunction    Granulomatosis with polyangiitis with renal involvement ([facility])    Benign prostatic hyperplasia without lower urinary tract symptoms    Allergic rhinitis    Osteoarthritis of carpometacarpal (CMC) joint of thumb    HTN (hypertension), benign    Chronic maxillary sinusitis    Chronic ethmoidal sinusitis    Chronic sphenoidal sinusitis     Current Outpatient Medications   Medication Sig    gabapentin (NEURONTIN) 600 MG tablet TAKE 1/2 TO 1 (ONE-HALF TO ONE) TABLET BY MOUTH TWICE DAILY AFTER DIALYSIS FOR PAIN MAX DAILY AMOUNT 2 TABLETS    sodium chloride (AYR) 0.65 % SOLN nasal drops USE 2 SPRAY(S) IN EACH NOSTRIL TWICE DAILY    benzonatate (TESSALON) 100 MG capsule TAKE 1 CAPSULE BY MOUTH THREE TIMES DAILY AS NEEDED FOR COUGH    fluticasone (FLONASE) 50 MCG/ACT nasal spray USE 2 SPRAY(S) IN EACH NOSTRIL ONCE DAILY START 10/2/22    clobetasol (TEMOVATE) 0.05 % cream Apply to affected area bid prn for up to 2 weeks-avoid face and groin    famotidine (PEPCID) 40 MG tablet Take 1 tablet by mouth 2 times daily    PARoxetine (PAXIL) 30 MG tablet Take 1 tablet by mouth daily    finasteride (PROSCAR) 5 MG tablet Take 1 tablet by mouth daily    ondansetron (ZOFRAN) 4 MG tablet Take 1 tablet by mouth 3 times daily as needed for Nausea or Vomiting    minoxidil (LONITEN) 10 MG tablet TAKE 1/2 (ONE-HALF) TABLET BY MOUTH IN THE EVENING    ezetimibe (ZETIA) 10 MG tablet TAKE 1 TABLET BY MOUTH ONCE DAILY    sevelamer hcl (RENAGEL) 800 MG tablet Take 1,600 mg by mouth 3 times daily (with meals)    sevelamer (RENVELA) 800 MG tablet TAKE 3 TABLETS BY MOUTH WITH MEALS THEN 1 WITH SNACKS    senna (SENOKOT) 8.6 MG tablet Take 8.6 mg by mouth as needed    predniSONE (DELTASONE) 20 MG tablet Take 80 mg by mouth daily    pantoprazole (PROTONIX) 40 MG tablet Take 40 mg by mouth 2 times daily    nitroGLYCERIN (NITROSTAT) 0.4 MG SL tablet Place 0.4 mg under the tongue every 5 minutes as needed    Evolocumab 140 MG/ML SOAJ Inject 140 mg into the skin every 14 days    Coenzyme Q10 300 MG CAPS Take 1 capsule by mouth daily    alirocumab (PRALUENT) 75 MG/ML SOAJ injection pen INJECT 75MG SUBCUTANEOUSLY EVERY 14 DAYS    amLODIPine (NORVASC) 10 MG tablet Take 10 mg by mouth daily    aspirin 81 MG EC tablet Take by mouth daily    budesonide (PULMICORT) 0.5 MG/2ML nebulizer suspension Inhale 500 mcg into the lungs 2 times daily    furosemide (LASIX) 80 MG tablet Take 80 mg by mouth every other day    gabapentin (NEURONTIN) 300 MG capsule 1 capsule    nebivolol (BYSTOLIC) 10 MG tablet Take 1 tablet by mouth daily    ondansetron (ZOFRAN-ODT) 8 MG TBDP disintegrating tablet Take 8 mg by mouth every 8 hours as needed    trimethoprim-polymyxin b (POLYTRIM) 64336-6.1 UNIT/ML-% ophthalmic solution 1 drop daily    traMADol (ULTRAM) 50 MG tablet Take 50 mg by mouth every 6 hours as needed. triamcinolone acetonide (KENALOG) 0.1 % paste Place onto teeth 2 times daily     No current facility-administered medications for this visit. Past Medical History:   Diagnosis Date    Allergic rhinitis 5/8/2013    Benign prostatic hyperplasia without lower urinary tract symptoms 6/22/2016    CAD (coronary artery disease)     catherization    Chronic back pain     cervico-thoracic facet jt INJs done at [facility] pain clinic, last one 3/2019    Chronic pansinusitis     Coronary artery disease involving native coronary artery without angina pectoris 10/05/2015    CCS 1821 in 7/15. LHC in 8/15 - sign obstructive dz in the LAD, stent placed. SUSANNE placed in RCA. He's unable to tolerate stains.  f/w Dr. Tu Aden     Depression, prolonged 12/21/2015    Eczema 1/6/2014    GERD (gastroesophageal reflux disease)     Granulomatosis with polyangiitis ([facility] [address] 2021    Hypertension     OA (osteoarthritis)     Peritoneal dialysis status ([facility] [address] 2021    Peyronie disease 5/8/2013    Urethral stricture with dilation, managed by URO    Presence of stent in LAD coronary artery 12/28/2015    Presence of stent in right coronary artery 12/28/2015    8/18/15 Drug eluting stent placed    Sigmoid diverticulosis 2020    mild    Urolithiasis     Dr Walter Morton History     Tobacco Use    Smoking status: Never    Smokeless tobacco: Never   Substance Use Topics    Alcohol use: No     Past Surgical History:   Procedure Laterality Date    COLONOSCOPY N/A 2020    COLONOSCOPY/ 27 performed by Ollie Antunez MD at [facility] ENDOSCOPY    GI      colonoscopy    ORTHOPEDIC SURGERY Left 2019    [facility] Arthroplasty    OTHER SURGICAL HISTORY  2021    catheter for HD    [place] CARDIAC SURG PROCEDURE UNLIST  2015    M LAD, Prox LAD, Distal RCA, LAD distal stents    SINUS ENDOSCOPY Bilateral 2022    SINUS ENDOSCOPY FUNCTIONAL SURGERY Bilateral Maxillary Antrostomies, Bilateral Anterior Ethmoidectomies performed by Joo Hood MD at [address] Bilateral 2022    revision FESS- Erlene Puna     Family History   Problem Relation Age of Onset    Heart Disease Other         GF- age 52    Elevated Lipids Father     Hypertension Father     Stroke Father     Other Mother         neurofibromatosis    Colon Cancer Mother          developed early 40s(NEUROFIBROMATOSIS)        ROS:    Review of Systems   Constitutional:  Negative for chills. Eyes:  Negative for pain. Respiratory:  Negative for cough and wheezing. Cardiovascular:  Negative for chest pain and palpitations. Gastrointestinal:  Negative for diarrhea and vomiting. Skin:  Negative for color change and wound. Allergic/Immunologic: Negative for environmental allergies. Neurological:  Negative for dizziness and headaches. PHYSICAL EXAM:    Resp 18   Ht 5' 9\" (1.753 m)   Wt 195 lb (88.5 kg)   BMI 28.80 kg/m²     General: NAD, well-appearing  Neuro: No gross neuro deficits. No facial weakness. Eyes: No periorbital edema/ecchymosis. No nystagmus. Skin: No facial erythema, rashes or concerning lesions. Nose: No external deviations or saddling. Intranasally, septum is slightly deviated to the left side, endoscopy recommended for further evaluation. Mouth: Moist mucus membranes, normal tongue/palate mobility, no concerning mucosal lesions. Oropharynx clear with no erythema/exudate, no tonsillar hypertrophy. Ears: Normal appearing auricles, no hematomas.  EACs clear with no cerumen impaction, healthy canal skin, TM's intact with no perforations or retraction pockets. No middle ear effusions. Neck: Soft, supple, no palpable neck masses. No palpable parotid or submandibular masses. No thyromegaly or palpable thyroid nodules. Lymphatics: No palpable cervical LAD. Resp: No audible stridor or wheezing. CV: No murmurs, no JVD. Extremities: No clubbing or cyanosis. PROCEDURE: Nasal endoscopy  INDICATIONS: CRS, sp FESS  DESCRIPTION: After verbal consent was obtained and a timeout was performed, the 0 degree rigid nasal endoscope was advanced into both nares. The septum was midline w/ no perforations. There were no masses seen along the nasal floor or within the nasopharynx. On the R side, the turbinates were intact and the middle meatus was clear. There was some mild tan-colored mucus strands but no polyps. The maxillary ostia was widely patent and the ethmoids were clear as well. . On the L side,  the turbinates were intact and the middle meatus was clear. There was some mild tan-colored mucus strands but no polyps. The maxillary ostia was widely patent and the ethmoids were clear as well. The scope was then removed and the patient tolerated the procedure well w/ no complications. ASSESSMENT and PLAN      ICD-10-CM    1. Chronic pansinusitis  J32.4 NASAL ENDOSCOPY,DX      2. Granulomatosis with polyangiitis, unspecified whether renal involvement ([facility] [address]  M31.30         Doing well now 6 weeks out from his revision FESS. There was some mild mucoid drainage bilaterally, but both middle meatuses were clear and he is breathing much better since surgery. He will continue irrigating with topical Budesonide and I will see him back in 3 months for another sinus check.     Rasheeda Loya MD  12/16/2022    Electronically signed by Rasheeda Loya MD on 12/16/2022 at 2:40 PM

## 2023-01-17 RX ORDER — ONDANSETRON 4 MG/1
4 TABLET, FILM COATED ORAL 3 TIMES DAILY PRN
Qty: 30 TABLET | Refills: 2 | Status: SHIPPED | OUTPATIENT
Start: 2023-01-17

## 2023-01-20 ENCOUNTER — TELEMEDICINE (OUTPATIENT)
Dept: FAMILY MEDICINE CLINIC | Facility: CLINIC | Age: 58
End: 2023-01-20
Payer: MEDICARE

## 2023-01-20 DIAGNOSIS — Z99.2 CKD (CHRONIC KIDNEY DISEASE) REQUIRING CHRONIC DIALYSIS (HCC): ICD-10-CM

## 2023-01-20 DIAGNOSIS — I95.9 HYPOTENSION, UNSPECIFIED HYPOTENSION TYPE: ICD-10-CM

## 2023-01-20 DIAGNOSIS — N18.6 CKD (CHRONIC KIDNEY DISEASE) REQUIRING CHRONIC DIALYSIS (HCC): ICD-10-CM

## 2023-01-20 DIAGNOSIS — M31.31 GRANULOMATOSIS WITH POLYANGIITIS WITH RENAL INVOLVEMENT (HCC): Primary | ICD-10-CM

## 2023-01-20 DIAGNOSIS — Z99.2 PERITONEAL DIALYSIS STATUS (HCC): ICD-10-CM

## 2023-01-20 DIAGNOSIS — F33.1 MODERATE EPISODE OF RECURRENT MAJOR DEPRESSIVE DISORDER (HCC): ICD-10-CM

## 2023-01-20 PROCEDURE — G8427 DOCREV CUR MEDS BY ELIG CLIN: HCPCS | Performed by: FAMILY MEDICINE

## 2023-01-20 PROCEDURE — 3017F COLORECTAL CA SCREEN DOC REV: CPT | Performed by: FAMILY MEDICINE

## 2023-01-20 PROCEDURE — 99214 OFFICE O/P EST MOD 30 MIN: CPT | Performed by: FAMILY MEDICINE

## 2023-01-20 RX ORDER — ONDANSETRON 8 MG/1
8 TABLET, ORALLY DISINTEGRATING ORAL EVERY 8 HOURS PRN
Qty: 60 TABLET | Refills: 5 | Status: SHIPPED | OUTPATIENT
Start: 2023-01-20

## 2023-01-20 RX ORDER — PAROXETINE HYDROCHLORIDE 40 MG/1
40 TABLET, FILM COATED ORAL EVERY MORNING
Qty: 30 TABLET | Refills: 0 | Status: SHIPPED | OUTPATIENT
Start: 2023-01-20

## 2023-01-20 ASSESSMENT — PATIENT HEALTH QUESTIONNAIRE - PHQ9
1. LITTLE INTEREST OR PLEASURE IN DOING THINGS: 1
9. THOUGHTS THAT YOU WOULD BE BETTER OFF DEAD, OR OF HURTING YOURSELF: 0
6. FEELING BAD ABOUT YOURSELF - OR THAT YOU ARE A FAILURE OR HAVE LET YOURSELF OR YOUR FAMILY DOWN: 0
7. TROUBLE CONCENTRATING ON THINGS, SUCH AS READING THE NEWSPAPER OR WATCHING TELEVISION: 0
SUM OF ALL RESPONSES TO PHQ9 QUESTIONS 1 & 2: 4
3. TROUBLE FALLING OR STAYING ASLEEP: 1
8. MOVING OR SPEAKING SO SLOWLY THAT OTHER PEOPLE COULD HAVE NOTICED. OR THE OPPOSITE, BEING SO FIGETY OR RESTLESS THAT YOU HAVE BEEN MOVING AROUND A LOT MORE THAN USUAL: 0
10. IF YOU CHECKED OFF ANY PROBLEMS, HOW DIFFICULT HAVE THESE PROBLEMS MADE IT FOR YOU TO DO YOUR WORK, TAKE CARE OF THINGS AT HOME, OR GET ALONG WITH OTHER PEOPLE: 1
5. POOR APPETITE OR OVEREATING: 0
SUM OF ALL RESPONSES TO PHQ QUESTIONS 1-9: 6
4. FEELING TIRED OR HAVING LITTLE ENERGY: 1
SUM OF ALL RESPONSES TO PHQ QUESTIONS 1-9: 6
2. FEELING DOWN, DEPRESSED OR HOPELESS: 3

## 2023-01-20 NOTE — PROGRESS NOTES
Hailey Lema III is a 62 y.o. male who was seen by synchronous (real-time) audio-video technology on 1/20/2023. Subjective:   Hailey Lema III was seen for Other (Worsening depression)  Pt was diagnosed with granulomatosis with polyangiitis in 2021 and since then, has had drastic changes with his health. Now on peritoneal dialysis and drastic decline in quality of life  Recently saw his rheumatologist 1/5/23 in [facility] who suggested adjusting his med. He is having worsening depression since all of this. No energy. Most of the time, he is home bound. 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 His PD takes 8 hours and keeps him up at night. Normally sleeps during the day. BP 86/60 at his 1/5 appt. 86/69 last night  BP currently is 117/76 HR 75  On norvasc 10 and bystolic 10  He does PD every night. He doesn't monitor his BP before or after this. He uses zofran 8 prn, about twice a week      Allergies   Allergen Reactions    Codeine Itching and Nausea And Vomiting    Penicillins Hives    Statins Other (See Comments)         Objective:     General: alert, cooperative, and cushingoid appearance now   Mental  status: mental status: alert, oriented to person, place, and time, depressed mood   Resp: No distress. Neuro: No acute deficits   Skin: skin: no discoloration or lesions of concern on visible areas     Due to this being a TeleHealth evaluation, many elements of the physical examination are unable to be assessed. Assessment & Plan:   Jesus Figueroa was seen today for other. Diagnoses and all orders for this visit:    Granulomatosis with polyangiitis with renal involvement ([facility]  -     ondansetron (ZOFRAN-ODT) 8 MG TBDP disintegrating tablet; Take 1 tablet by mouth every 8 hours as needed for Nausea    Moderate episode of recurrent major depressive disorder (HCC)  -     PARoxetine (PAXIL) 40 MG tablet;  Take 1 tablet by mouth every morning    Hypotension, unspecified hypotension type    CKD (chronic kidney disease) requiring chronic dialysis ([facility] [address]    Peritoneal dialysis status ([facility] [address]    Will increase paxil to 40. I'm sure the chronic interrupted sleep isn't helping but he has to be able to wake and adjust his PD catheter at times, so not able to do a sleep med. I've asked him to start checking BP more often as the low BP could be contributing to fatigue. F/u 4 wks, sooner prn        CPT Codes 17381-70539 for Established Patients may apply to this Telehealth Visit    Ale Deutsch III was evaluated through a synchronous (real-time) audio-video encounter. The patient (or guardian if applicable) is aware that this is a billable service, which includes applicable co-pays. This Virtual Visit was conducted with patient's (and/or leg guardian's) consent. The visit was conducted pursuant to the emergency declaration under the [address] [place] [address], hospitals waiver authority and the "[employer] and [employer] General Act. Patient identification was verified, and a caregiver was present when appropriate. The patient was located in a state where the provider was licensed to provide care. I was at home while conducting this encounter. Pt was at home. We discussed the expected course, resolution and complications of the diagnosis(es) in detail. Medication risks, benefits, costs, interactions, and alternatives were discussed as indicated. I advised him to contact the office if his condition worsens, changes or fails to improve as anticipated. He expressed understanding with the diagnosis(es) and plan.      Lieutenant Jud DO

## 2023-01-30 ENCOUNTER — OFFICE VISIT (OUTPATIENT)
Dept: ENT CLINIC | Age: 58
End: 2023-01-30
Payer: MEDICARE

## 2023-01-30 VITALS — HEIGHT: 69 IN | BODY MASS INDEX: 29.03 KG/M2 | WEIGHT: 196 LBS | OXYGEN SATURATION: 97 % | HEART RATE: 86 BPM

## 2023-01-30 DIAGNOSIS — M31.30 GRANULOMATOSIS WITH POLYANGIITIS, UNSPECIFIED WHETHER RENAL INVOLVEMENT (HCC): ICD-10-CM

## 2023-01-30 DIAGNOSIS — J32.4 CHRONIC PANSINUSITIS: Primary | ICD-10-CM

## 2023-01-30 DIAGNOSIS — R05.3 CHRONIC COUGH: ICD-10-CM

## 2023-01-30 PROCEDURE — 99214 OFFICE O/P EST MOD 30 MIN: CPT | Performed by: OTOLARYNGOLOGY

## 2023-01-30 PROCEDURE — 31231 NASAL ENDOSCOPY DX: CPT | Performed by: OTOLARYNGOLOGY

## 2023-01-30 RX ORDER — CALCIUM POLYCARBOPHIL 625 MG
1250 TABLET ORAL 2 TIMES DAILY
COMMUNITY
Start: 2023-01-23

## 2023-01-30 RX ORDER — HYDROCODONE BITARTRATE AND HOMATROPINE METHYLBROMIDE ORAL SOLUTION 5; 1.5 MG/5ML; MG/5ML
5 LIQUID ORAL EVERY 6 HOURS PRN
Qty: 250 ML | Refills: 0 | Status: SHIPPED | OUTPATIENT
Start: 2023-01-30 | End: 2023-03-01

## 2023-01-30 RX ORDER — LISINOPRIL 2.5 MG/1
1 TABLET ORAL DAILY
COMMUNITY
Start: 2020-12-14

## 2023-01-30 RX ORDER — MYCOPHENOLIC ACID 360 MG/1
TABLET, DELAYED RELEASE ORAL
COMMUNITY
Start: 2023-01-22

## 2023-01-30 ASSESSMENT — ENCOUNTER SYMPTOMS
VOMITING: 0
EYE PAIN: 0
WHEEZING: 0
COLOR CHANGE: 0
COUGH: 0
DIARRHEA: 0

## 2023-01-30 NOTE — PROGRESS NOTES
Chief Complaint   Patient presents with    Follow-up     Patient is here for his follow up and states that he is doing well. HPI:  Chelita Duong III is a 62 y.o. male seen in follow-up for his CRS- he has underlying Wegener's granulomatosis and underwent revision FESS back on 11/2/2022. Doing well overall since his last visit with greatly improved airflow bilaterally. He is irrigating daily with topical budesonide and still gets out some yellow crusts. There is no facial pain or pressure. He does still have a chronic cough, especially at nighttime, and needs a refill on his cough syrup. He has an upcoming appointment to see pulmonology to discuss his lung function. Past Medical History, Past Surgical History, Family history, Social History, and Medications were all reviewed with the patient today and updated as necessary.      Allergies   Allergen Reactions    Codeine Itching and Nausea And Vomiting    Penicillins Hives    Statins Other (See Comments)     Patient Active Problem List   Diagnosis    CKD (chronic kidney disease) requiring chronic dialysis ([facility] [address]    Encounter for long term current use of cyclophosphamide    Type 2 diabetes mellitus, with long-term current use of insulin ([facility])    Microcytic anemia    Thrombocytosis    Recurrent major depressive disorder, in remission ([facility] [address]    Eczema    OA (osteoarthritis)    Coronary artery disease involving native coronary artery without angina pectoris    Mixed hyperlipidemia    GERD (gastroesophageal reflux disease)    Peyronie disease    Erectile dysfunction    Granulomatosis with polyangiitis with renal involvement (HCC)    Benign prostatic hyperplasia without lower urinary tract symptoms    Allergic rhinitis    Osteoarthritis of carpometacarpal (CMC) joint of thumb    HTN (hypertension), benign    Chronic maxillary sinusitis    Chronic ethmoidal sinusitis    Chronic sphenoidal sinusitis    Peritoneal dialysis status [facility])     Current Outpatient Medications   Medication Sig    lisinopril (PRINIVIL;ZESTRIL) 2.5 MG tablet Take 1 tablet by mouth daily    polycarbophil (FIBERCON) 625 MG tablet Take 1,250 mg by mouth 2 times daily    diclofenac sodium (VOLTAREN) 1 % GEL Apply 1-2 g topically as needed    Mycophenolate Sodium 360 MG TBEC TAKE 1 TABLET BY MOUTH IN THE MORNING AND 2 TABLETS (720 MG) IN THE AFTERNOON    HYDROcodone homatropine (HYCODAN) 5-1.5 MG/5ML solution Take 5 mLs by mouth every 6 hours as needed (cough) for up to 30 days.  Max Daily Amount: 20 mLs    ondansetron (ZOFRAN-ODT) 8 MG TBDP disintegrating tablet Take 1 tablet by mouth every 8 hours as needed for Nausea    PARoxetine (PAXIL) 40 MG tablet Take 1 tablet by mouth every morning    ondansetron (ZOFRAN) 4 MG tablet Take 1 tablet by mouth 3 times daily as needed for Nausea or Vomiting    gabapentin (NEURONTIN) 600 MG tablet TAKE 1/2 TO 1 (ONE-HALF TO ONE) TABLET BY MOUTH TWICE DAILY AFTER DIALYSIS FOR PAIN MAX DAILY AMOUNT 2 TABLETS    sodium chloride (AYR) 0.65 % SOLN nasal drops USE 2 SPRAY(S) IN EACH NOSTRIL TWICE DAILY    benzonatate (TESSALON) 100 MG capsule TAKE 1 CAPSULE BY MOUTH THREE TIMES DAILY AS NEEDED FOR COUGH    fluticasone (FLONASE) 50 MCG/ACT nasal spray     clobetasol (TEMOVATE) 0.05 % cream Apply to affected area bid prn for up to 2 weeks-avoid face and groin    famotidine (PEPCID) 40 MG tablet Take 1 tablet by mouth 2 times daily    finasteride (PROSCAR) 5 MG tablet Take 1 tablet by mouth daily    minoxidil (LONITEN) 10 MG tablet TAKE 1/2 (ONE-HALF) TABLET BY MOUTH IN THE EVENING    ezetimibe (ZETIA) 10 MG tablet TAKE 1 TABLET BY MOUTH ONCE DAILY    sevelamer hcl (RENAGEL) 800 MG tablet Take 1,600 mg by mouth 3 times daily (with meals)    sevelamer (RENVELA) 800 MG tablet TAKE 3 TABLETS BY MOUTH WITH MEALS THEN 1 WITH SNACKS    senna (SENOKOT) 8.6 MG tablet Take 8.6 mg by mouth as needed    predniSONE (DELTASONE) 20 MG tablet Take 80 mg by mouth daily    pantoprazole (PROTONIX) 40 MG tablet Take 40 mg by mouth 2 times daily    nitroGLYCERIN (NITROSTAT) 0.4 MG SL tablet Place 0.4 mg under the tongue every 5 minutes as needed    Evolocumab 140 MG/ML SOAJ Inject 140 mg into the skin every 14 days    Coenzyme Q10 300 MG CAPS Take 1 capsule by mouth daily    alirocumab (PRALUENT) 75 MG/ML SOAJ injection pen INJECT 75MG SUBCUTANEOUSLY EVERY 14 DAYS    amLODIPine (NORVASC) 10 MG tablet Take 10 mg by mouth daily    aspirin 81 MG EC tablet Take by mouth daily    budesonide (PULMICORT) 0.5 MG/2ML nebulizer suspension Inhale 500 mcg into the lungs 2 times daily    furosemide (LASIX) 80 MG tablet Take 80 mg by mouth every other day    gabapentin (NEURONTIN) 300 MG capsule 1 capsule    nebivolol (BYSTOLIC) 10 MG tablet Take 1 tablet by mouth daily    trimethoprim-polymyxin b (POLYTRIM) 00475-3.1 UNIT/ML-% ophthalmic solution 1 drop daily    traMADol (ULTRAM) 50 MG tablet Take 50 mg by mouth every 6 hours as needed. triamcinolone acetonide (KENALOG) 0.1 % paste Place onto teeth 2 times daily     No current facility-administered medications for this visit. Past Medical History:   Diagnosis Date    Allergic rhinitis 5/8/2013    Benign prostatic hyperplasia without lower urinary tract symptoms 6/22/2016    CAD (coronary artery disease)     catherization    Chronic back pain     cervico-thoracic facet jt INJs done at [facility] pain clinic, last one 3/2019    Chronic pansinusitis     Coronary artery disease involving native coronary artery without angina pectoris 10/05/2015    CCS 1821 in 7/15. LHC in 8/15 - sign obstructive dz in the LAD, stent placed. SUSANNE placed in RCA. He's unable to tolerate stains.  f/w Dr. Irasema Wharton     Depression, prolonged 12/21/2015    Eczema 1/6/2014    GERD (gastroesophageal reflux disease)     Granulomatosis with polyangiitis ([address] 2021    Hypertension     OA (osteoarthritis)     Peritoneal dialysis status ([facility] [address] 2021    Peyronie disease 5/8/2013    Urethral stricture with dilation, managed by URO    Presence of stent in LAD coronary artery 2015    Presence of stent in right coronary artery 2015    8/18/15 Drug eluting stent placed    Sigmoid diverticulosis     mild    Urolithiasis     Dr Valentina Ramriez History     Tobacco Use    Smoking status: Never    Smokeless tobacco: Never   Substance Use Topics    Alcohol use: No     Past Surgical History:   Procedure Laterality Date    COLONOSCOPY N/A 2020    COLONOSCOPY/ 27 performed by Kaiser Delgadillo MD at [facility] ENDOSCOPY    GI      colonoscopy    ORTHOPEDIC SURGERY Left 2019    [facility] Arthroplasty    OTHER SURGICAL HISTORY  2021    catheter for HD    MT UNLISTED PROCEDURE CARDIAC SURGERY  2015    M LAD, Prox LAD, Distal RCA, LAD distal stents    SINUS ENDOSCOPY Bilateral 2022    SINUS ENDOSCOPY FUNCTIONAL SURGERY Bilateral Maxillary Antrostomies, Bilateral Anterior Ethmoidectomies performed by Jackie Fu MD at [address] Bilateral 2022    revision FESS- Louanna Fail     Family History   Problem Relation Age of Onset    Heart Disease Other         GF- age 52    Elevated Lipids Father     Hypertension Father     Stroke Father     Other Mother         neurofibromatosis    Colon Cancer Mother          developed early 40s(NEUROFIBROMATOSIS)        ROS:    Review of Systems   Constitutional:  Negative for chills. Eyes:  Negative for pain. Respiratory:  Negative for cough and wheezing. Cardiovascular:  Negative for chest pain and palpitations. Gastrointestinal:  Negative for diarrhea and vomiting. Skin:  Negative for color change and wound. Allergic/Immunologic: Negative for environmental allergies. Neurological:  Negative for dizziness and headaches. PHYSICAL EXAM:    Pulse 86   Ht 5' 9\" (1.753 m)   Wt 196 lb (88.9 kg)   SpO2 97%   BMI 28.94 kg/m²     General: NAD, well-appearing  Neuro: No gross neuro deficits. No facial weakness.   Eyes: No periorbital edema/ecchymosis. No nystagmus. Skin: No facial erythema, rashes or concerning lesions. Nose: No external deviations or saddling. Intranasally, septum is midline without perforations, changes c/w previous FESS- endoscopy recommended  Mouth: Moist mucus membranes, normal tongue/palate mobility, no concerning mucosal lesions. Oropharynx clear with no erythema/exudate, no tonsillar hypertrophy. Ears: Normal appearing auricles, no hematomas. EACs clear with no cerumen impaction, healthy canal skin, TM's have patent PE tubes bilaterally- both ME spaces are clear. Neck: Soft, supple, no palpable neck masses. No palpable parotid or submandibular masses. No thyromegaly or palpable thyroid nodules. Lymphatics: No palpable cervical LAD. Resp: No audible stridor or wheezing. CV: No murmurs, no JVD. Extremities: No clubbing or cyanosis. PROCEDURE: Nasal endoscopy  INDICATIONS: CRS, recent FESS  DESCRIPTION: After verbal consent was obtained and a timeout was performed, the 0 degree rigid nasal endoscope was advanced into both nares. The septum was midline w/ no perforations. There were no masses seen along the nasal floor or within the nasopharynx. On the R side, the turbinates were intact with just mild lateralization of the middle turbinate. There was a widely patent maxillary sinus ostia with no scarring. No polyps or purulence noted. Clear ethmoids anteriorly and posteriorly. On the L side, there was better airflow overall with no turbinate hypertrophy. Widely patent maxillary sinus ostia with no scarring. Several strands of yellow-tinged mucus which was suctioned away. Ethmoid roof is clear. No polyps. The scope was then removed and the patient tolerated the procedure well w/ no complications. ASSESSMENT and PLAN      ICD-10-CM    1. Chronic pansinusitis  J32.4 NASAL ENDOSCOPY,DX      2. Granulomatosis with polyangiitis, unspecified whether renal involvement ([address]  M31.30       3. Chronic cough  R05.3 HYDROcodone homatropine (HYCODAN) 5-1.5 MG/5ML solution        He is doing well now almost 3 months out from his revision FESS. He looks good on endoscopy w/ no recurrent polyps and will continue with topical steroid irrigations. I refilled his cough syrup and he has an upcoming appointment to see Pulmonology as well. RTC in 4 months for routine follow-up.     Maribel Malin MD  1/30/2023    Electronically signed by Maribel Malin MD on 1/30/2023 at 11:41 AM

## 2023-02-22 DIAGNOSIS — K21.9 GASTROESOPHAGEAL REFLUX DISEASE, UNSPECIFIED WHETHER ESOPHAGITIS PRESENT: ICD-10-CM

## 2023-02-22 DIAGNOSIS — F33.1 MODERATE EPISODE OF RECURRENT MAJOR DEPRESSIVE DISORDER (HCC): ICD-10-CM

## 2023-02-22 RX ORDER — PAROXETINE HYDROCHLORIDE 40 MG/1
40 TABLET, FILM COATED ORAL EVERY MORNING
Qty: 30 TABLET | Refills: 0 | Status: SHIPPED | OUTPATIENT
Start: 2023-02-22

## 2023-02-22 RX ORDER — FAMOTIDINE 40 MG/1
40 TABLET, FILM COATED ORAL 2 TIMES DAILY
Qty: 180 TABLET | Refills: 1 | Status: SHIPPED | OUTPATIENT
Start: 2023-02-22

## 2023-03-21 ENCOUNTER — TELEMEDICINE (OUTPATIENT)
Dept: FAMILY MEDICINE CLINIC | Facility: CLINIC | Age: 58
End: 2023-03-21
Payer: MEDICARE

## 2023-03-21 DIAGNOSIS — F33.9 RECURRENT MAJOR DEPRESSIVE DISORDER, REMISSION STATUS UNSPECIFIED (HCC): ICD-10-CM

## 2023-03-21 DIAGNOSIS — M31.31 GRANULOMATOSIS WITH POLYANGIITIS WITH RENAL INVOLVEMENT (HCC): ICD-10-CM

## 2023-03-21 DIAGNOSIS — N40.0 BENIGN PROSTATIC HYPERPLASIA, UNSPECIFIED WHETHER LOWER URINARY TRACT SYMPTOMS PRESENT: Primary | ICD-10-CM

## 2023-03-21 DIAGNOSIS — I25.119 CORONARY ARTERY DISEASE WITH ANGINA PECTORIS, UNSPECIFIED VESSEL OR LESION TYPE, UNSPECIFIED WHETHER NATIVE OR TRANSPLANTED HEART (HCC): ICD-10-CM

## 2023-03-21 DIAGNOSIS — I10 HYPERTENSION, UNSPECIFIED TYPE: ICD-10-CM

## 2023-03-21 DIAGNOSIS — N18.6 CKD (CHRONIC KIDNEY DISEASE) REQUIRING CHRONIC DIALYSIS (HCC): ICD-10-CM

## 2023-03-21 DIAGNOSIS — Z99.2 PERITONEAL DIALYSIS STATUS (HCC): ICD-10-CM

## 2023-03-21 DIAGNOSIS — K21.9 GASTROESOPHAGEAL REFLUX DISEASE, UNSPECIFIED WHETHER ESOPHAGITIS PRESENT: ICD-10-CM

## 2023-03-21 DIAGNOSIS — E11.69 TYPE 2 DIABETES MELLITUS WITH OTHER SPECIFIED COMPLICATION, WITHOUT LONG-TERM CURRENT USE OF INSULIN (HCC): ICD-10-CM

## 2023-03-21 DIAGNOSIS — E78.5 HYPERLIPIDEMIA, UNSPECIFIED HYPERLIPIDEMIA TYPE: ICD-10-CM

## 2023-03-21 DIAGNOSIS — Z99.2 CKD (CHRONIC KIDNEY DISEASE) REQUIRING CHRONIC DIALYSIS (HCC): ICD-10-CM

## 2023-03-21 DIAGNOSIS — F41.0 PANIC ANXIETY SYNDROME: ICD-10-CM

## 2023-03-21 PROBLEM — E11.9 DM2 (DIABETES MELLITUS, TYPE 2) (HCC): Status: ACTIVE | Noted: 2021-01-01

## 2023-03-21 PROBLEM — F41.9 ANXIETY DISORDER, UNSPECIFIED: Status: ACTIVE | Noted: 2021-06-17

## 2023-03-21 PROBLEM — J32.2 CHRONIC ETHMOIDAL SINUSITIS: Status: RESOLVED | Noted: 2022-11-02 | Resolved: 2023-03-21

## 2023-03-21 PROBLEM — Z79.630: Status: ACTIVE | Noted: 2021-07-01

## 2023-03-21 PROBLEM — M18.9 OSTEOARTHRITIS OF CARPOMETACARPAL (CMC) JOINT OF THUMB: Status: RESOLVED | Noted: 2017-11-02 | Resolved: 2023-03-21

## 2023-03-21 PROBLEM — J32.9 CHRONIC RECURRENT SINUSITIS: Status: ACTIVE | Noted: 2022-11-02

## 2023-03-21 PROBLEM — J32.0 CHRONIC MAXILLARY SINUSITIS: Status: RESOLVED | Noted: 2022-10-10 | Resolved: 2023-03-21

## 2023-03-21 PROCEDURE — G8484 FLU IMMUNIZE NO ADMIN: HCPCS | Performed by: FAMILY MEDICINE

## 2023-03-21 PROCEDURE — 1036F TOBACCO NON-USER: CPT | Performed by: FAMILY MEDICINE

## 2023-03-21 PROCEDURE — G8419 CALC BMI OUT NRM PARAM NOF/U: HCPCS | Performed by: FAMILY MEDICINE

## 2023-03-21 PROCEDURE — 2022F DILAT RTA XM EVC RTNOPTHY: CPT | Performed by: FAMILY MEDICINE

## 2023-03-21 PROCEDURE — G8427 DOCREV CUR MEDS BY ELIG CLIN: HCPCS | Performed by: FAMILY MEDICINE

## 2023-03-21 PROCEDURE — 99215 OFFICE O/P EST HI 40 MIN: CPT | Performed by: FAMILY MEDICINE

## 2023-03-21 PROCEDURE — 3046F HEMOGLOBIN A1C LEVEL >9.0%: CPT | Performed by: FAMILY MEDICINE

## 2023-03-21 PROCEDURE — 3017F COLORECTAL CA SCREEN DOC REV: CPT | Performed by: FAMILY MEDICINE

## 2023-03-21 RX ORDER — SULFAMETHOXAZOLE AND TRIMETHOPRIM 400; 80 MG/1; MG/1
TABLET ORAL
COMMUNITY
Start: 2023-03-06

## 2023-03-21 RX ORDER — CLOBETASOL PROPIONATE 0.5 MG/G
CREAM TOPICAL
Qty: 60 G | Refills: 5 | Status: CANCELLED | OUTPATIENT
Start: 2023-03-21

## 2023-03-21 RX ORDER — PAROXETINE HYDROCHLORIDE 40 MG/1
40 TABLET, FILM COATED ORAL EVERY MORNING
Qty: 90 TABLET | Refills: 0 | Status: SHIPPED | OUTPATIENT
Start: 2023-03-21

## 2023-03-21 RX ORDER — FAMOTIDINE 40 MG/1
40 TABLET, FILM COATED ORAL 2 TIMES DAILY
Qty: 180 TABLET | Refills: 1 | Status: SHIPPED | OUTPATIENT
Start: 2023-03-21

## 2023-03-21 RX ORDER — FINASTERIDE 5 MG/1
5 TABLET, FILM COATED ORAL DAILY
Qty: 90 TABLET | Refills: 1 | Status: SHIPPED | OUTPATIENT
Start: 2023-03-21

## 2023-03-21 SDOH — ECONOMIC STABILITY: FOOD INSECURITY: WITHIN THE PAST 12 MONTHS, THE FOOD YOU BOUGHT JUST DIDN'T LAST AND YOU DIDN'T HAVE MONEY TO GET MORE.: NEVER TRUE

## 2023-03-21 SDOH — ECONOMIC STABILITY: INCOME INSECURITY: HOW HARD IS IT FOR YOU TO PAY FOR THE VERY BASICS LIKE FOOD, HOUSING, MEDICAL CARE, AND HEATING?: NOT HARD AT ALL

## 2023-03-21 SDOH — ECONOMIC STABILITY: HOUSING INSECURITY
IN THE LAST 12 MONTHS, WAS THERE A TIME WHEN YOU DID NOT HAVE A STEADY PLACE TO SLEEP OR SLEPT IN A SHELTER (INCLUDING NOW)?: NO

## 2023-03-21 SDOH — ECONOMIC STABILITY: FOOD INSECURITY: WITHIN THE PAST 12 MONTHS, YOU WORRIED THAT YOUR FOOD WOULD RUN OUT BEFORE YOU GOT MONEY TO BUY MORE.: NEVER TRUE

## 2023-03-21 NOTE — PROGRESS NOTES
[id number] [id number]. An electronic signature was used to authenticate this note.   -- Antonio Donahue MD

## 2023-03-21 NOTE — PATIENT INSTRUCTIONS

## 2023-03-28 ENCOUNTER — CARE COORDINATION (OUTPATIENT)
Dept: CARE COORDINATION | Facility: CLINIC | Age: 58
End: 2023-03-28

## 2023-03-28 NOTE — CARE COORDINATION
Ambulatory Care Management Note        Date/Time:  3/28/2023 11:39 AM    This patient was received as a referral from  Insurance referral for case management of high risk patient. Ccm outreached to patient. No answer at this time, message left. Awaiting response. If no response, [facility] will outreach tomorrow.

## 2023-03-29 ENCOUNTER — CARE COORDINATION (OUTPATIENT)
Dept: CARE COORDINATION | Facility: CLINIC | Age: 58
End: 2023-03-29

## 2023-03-29 NOTE — CARE COORDINATION
Ambulatory Care Management Note        Date/Time:  3/29/2023 9:05 AM    Ccm outreached to patient. No answer at this time, message left. Awaiting response. If no response, ccm will outreach next week.

## 2023-04-03 ENCOUNTER — CARE COORDINATION (OUTPATIENT)
Dept: CARE COORDINATION | Facility: CLINIC | Age: 58
End: 2023-04-03

## 2023-04-03 NOTE — CARE COORDINATION
Ambulatory Care Management Note        Date/Time:  4/3/2023 10:28 AM    Ccm outreached to patient. No answer at this time, message left. Awaiting response. Ccm will close case at this time d/t unable to reach x3. Ccm will remain available as needed.

## 2023-05-31 ENCOUNTER — OFFICE VISIT (OUTPATIENT)
Dept: ENT CLINIC | Age: 58
End: 2023-05-31
Payer: MEDICARE

## 2023-05-31 VITALS — WEIGHT: 196 LBS | BODY MASS INDEX: 29.03 KG/M2 | HEIGHT: 69 IN

## 2023-05-31 DIAGNOSIS — J32.4 CHRONIC PANSINUSITIS: Primary | ICD-10-CM

## 2023-05-31 DIAGNOSIS — M31.30 GRANULOMATOSIS WITH POLYANGIITIS, UNSPECIFIED WHETHER RENAL INVOLVEMENT (HCC): ICD-10-CM

## 2023-05-31 PROCEDURE — G8419 CALC BMI OUT NRM PARAM NOF/U: HCPCS | Performed by: OTOLARYNGOLOGY

## 2023-05-31 PROCEDURE — 1036F TOBACCO NON-USER: CPT | Performed by: OTOLARYNGOLOGY

## 2023-05-31 PROCEDURE — G8428 CUR MEDS NOT DOCUMENT: HCPCS | Performed by: OTOLARYNGOLOGY

## 2023-05-31 PROCEDURE — 3017F COLORECTAL CA SCREEN DOC REV: CPT | Performed by: OTOLARYNGOLOGY

## 2023-05-31 PROCEDURE — 99213 OFFICE O/P EST LOW 20 MIN: CPT | Performed by: OTOLARYNGOLOGY

## 2023-05-31 RX ORDER — METOLAZONE 10 MG/1
TABLET ORAL
COMMUNITY
Start: 2023-03-22

## 2023-05-31 RX ORDER — AVACOPAN 10 MG/1
CAPSULE ORAL
COMMUNITY
Start: 2023-04-24

## 2023-05-31 ASSESSMENT — ENCOUNTER SYMPTOMS
DIARRHEA: 0
VOMITING: 0
WHEEZING: 0
COLOR CHANGE: 0
COUGH: 0
EYE PAIN: 0

## 2023-05-31 NOTE — PROGRESS NOTES
Chief Complaint   Patient presents with    Follow-up     Patient is here for his follow up and states that he is the same. HPI:  Charo Bermudez III is a 62 y.o. male seen in follow-up for his CRS- he has underlying Wegener's granulomatosis and underwent revision FESS back on 11/2/2022. Last seen on 1/30/23. Doing well since last visit with excellent airflow bilaterally. He has some stable nasal drainage, but nothing has been discolored. He denies any significant facial pain/pressure. He has been getting some mucoid drainage out of his right eye lately, and wonders if this has to do with his recent lens surgery. He is pulmonary status has been stable overall, and he did establish care with Pulmonology since last visit. Past Medical History, Past Surgical History, Family history, Social History, and Medications were all reviewed with the patient today and updated as necessary.      Allergies   Allergen Reactions    Codeine Itching and Nausea And Vomiting    Penicillins Hives    Statins Other (See Comments)     Patient Active Problem List   Diagnosis    CKD (chronic kidney disease) requiring chronic dialysis ([facility] [address]    Encounter for long term current use of cyclophosphamide    DM2 (diabetes mellitus, type 2) (HCC)    Microcytic anemia    Thrombocytosis    MDD (major depressive disorder)    AD (atopic dermatitis)    OA (osteoarthritis)    CAD (coronary artery disease)    HLD (hyperlipidemia)    GERD (gastroesophageal reflux disease)    Peyronie disease    ED (erectile dysfunction)    Granulomatosis with polyangiitis with renal involvement (HCC)    BPH (benign prostatic hyperplasia)    AR (allergic rhinitis)    HTN (hypertension)    Chronic recurrent sinusitis    Peritoneal dialysis status (HCC)    Panic anxiety syndrome     Current Outpatient Medications   Medication Sig    TAVNEOS 10 MG CAPS     metOLazone (ZAROXOLYN) 10 MG tablet TAKE ONE 1 TABLET BY MOUTH IN THE MORNING UP TO 3 DAYS AS NEEDED FOR

## 2023-08-07 ENCOUNTER — OFFICE VISIT (OUTPATIENT)
Dept: ENT CLINIC | Age: 58
End: 2023-08-07
Payer: MEDICARE

## 2023-08-07 DIAGNOSIS — H65.493 CHRONIC OTITIS MEDIA OF BOTH EARS WITH EFFUSION: ICD-10-CM

## 2023-08-07 DIAGNOSIS — J32.4 CHRONIC PANSINUSITIS: Primary | ICD-10-CM

## 2023-08-07 DIAGNOSIS — Z45.89 TYMPANOSTOMY TUBE CHECK: ICD-10-CM

## 2023-08-07 PROCEDURE — 1036F TOBACCO NON-USER: CPT | Performed by: OTOLARYNGOLOGY

## 2023-08-07 PROCEDURE — 69433 CREATE EARDRUM OPENING: CPT | Performed by: OTOLARYNGOLOGY

## 2023-08-07 PROCEDURE — 3017F COLORECTAL CA SCREEN DOC REV: CPT | Performed by: OTOLARYNGOLOGY

## 2023-08-07 PROCEDURE — G8428 CUR MEDS NOT DOCUMENT: HCPCS | Performed by: OTOLARYNGOLOGY

## 2023-08-07 PROCEDURE — G8419 CALC BMI OUT NRM PARAM NOF/U: HCPCS | Performed by: OTOLARYNGOLOGY

## 2023-08-07 PROCEDURE — 99213 OFFICE O/P EST LOW 20 MIN: CPT | Performed by: OTOLARYNGOLOGY

## 2023-08-07 RX ORDER — EVOLOCUMAB 140 MG/ML
INJECTION, SOLUTION SUBCUTANEOUS
COMMUNITY
Start: 2023-07-16

## 2023-08-07 RX ORDER — SEMAGLUTIDE 0.68 MG/ML
INJECTION, SOLUTION SUBCUTANEOUS
COMMUNITY
Start: 2023-06-27 | End: 2023-08-22

## 2023-08-07 RX ORDER — GENTAMICIN SULFATE 1 MG/G
CREAM TOPICAL
COMMUNITY
Start: 2023-07-12

## 2023-08-07 ASSESSMENT — ENCOUNTER SYMPTOMS
VOMITING: 0
WHEEZING: 0
EYE PAIN: 0
COLOR CHANGE: 0
DIARRHEA: 0
COUGH: 0

## 2023-08-07 NOTE — PROGRESS NOTES
the left drum was fully healed and he had already reaccumulated a middle ear effusion. I placed tubes in both ears today and will see him back in 1-2 months for routine sinus/ear follow-up.     Chance Espinal MD  8/7/2023    Electronically signed by Chance Espinal MD on 8/7/2023 at 4:05 PM

## 2023-09-18 ENCOUNTER — OFFICE VISIT (OUTPATIENT)
Dept: ENT CLINIC | Age: 58
End: 2023-09-18
Payer: MEDICARE

## 2023-09-18 DIAGNOSIS — Z45.89 TYMPANOSTOMY TUBE CHECK: ICD-10-CM

## 2023-09-18 DIAGNOSIS — R05.3 CHRONIC COUGH: Primary | ICD-10-CM

## 2023-09-18 DIAGNOSIS — J32.4 CHRONIC PANSINUSITIS: ICD-10-CM

## 2023-09-18 PROCEDURE — 3017F COLORECTAL CA SCREEN DOC REV: CPT | Performed by: OTOLARYNGOLOGY

## 2023-09-18 PROCEDURE — 99213 OFFICE O/P EST LOW 20 MIN: CPT | Performed by: OTOLARYNGOLOGY

## 2023-09-18 PROCEDURE — 1036F TOBACCO NON-USER: CPT | Performed by: OTOLARYNGOLOGY

## 2023-09-18 PROCEDURE — G8419 CALC BMI OUT NRM PARAM NOF/U: HCPCS | Performed by: OTOLARYNGOLOGY

## 2023-09-18 PROCEDURE — G8428 CUR MEDS NOT DOCUMENT: HCPCS | Performed by: OTOLARYNGOLOGY

## 2023-09-18 RX ORDER — SEMAGLUTIDE 1.34 MG/ML
INJECTION, SOLUTION SUBCUTANEOUS
COMMUNITY
Start: 2023-09-17

## 2023-09-18 RX ORDER — POTASSIUM CHLORIDE 20 MEQ/1
TABLET, EXTENDED RELEASE ORAL
COMMUNITY
Start: 2023-09-07

## 2023-09-18 RX ORDER — HYDROCODONE BITARTRATE AND HOMATROPINE METHYLBROMIDE ORAL SOLUTION 5; 1.5 MG/5ML; MG/5ML
2.5 LIQUID ORAL EVERY 4 HOURS PRN
Qty: 250 ML | Refills: 0 | Status: SHIPPED | OUTPATIENT
Start: 2023-09-18 | End: 2023-10-18

## 2023-09-18 ASSESSMENT — ENCOUNTER SYMPTOMS
VOMITING: 0
COUGH: 0
EYE PAIN: 0
DIARRHEA: 0
WHEEZING: 0
COLOR CHANGE: 0

## 2023-09-18 NOTE — PROGRESS NOTES
sinusitis    Peritoneal dialysis status (HCC)    Panic anxiety syndrome     Current Outpatient Medications   Medication Sig    OZEMPIC, 1 MG/DOSE, 4 MG/3ML SOPN     sertraline (ZOLOFT) 50 MG tablet Take 1 tablet by mouth daily    potassium chloride (KLOR-CON M) 20 MEQ extended release tablet TAKE 2 TABLETS BY MOUTH ONCE DAILY FOR 2 DAYS THEN 1 TABLET ONCE DAILY    HYDROcodone homatropine (HYCODAN) 5-1.5 MG/5ML solution Take 2.5 mLs by mouth every 4 hours as needed (cough) for up to 30 days.  Max Daily Amount: 15 mLs    REPATHA SURECLICK 033 MG/ML SOAJ INJECT 1 ML SUBCUTANEOUSLY EVERY 14 DAYS    gentamicin (GARAMYCIN) 0.1 % cream APPLY CREAM TOPICALLY TO AFFECTED AREA(S) WITH DRESSING CHANGES    TAVNEOS 10 MG CAPS     metOLazone (ZAROXOLYN) 10 MG tablet TAKE ONE 1 TABLET BY MOUTH IN THE MORNING UP TO 3 DAYS AS NEEDED FOR FLUID REMOVAL    sulfamethoxazole-trimethoprim (BACTRIM;SEPTRA) 400-80 MG per tablet TAKE 1 TABLET BY MOUTH THREE TIMES A WEEK ON MONDAY, WEDNESDAY AND FRIDAY    PARoxetine (PAXIL) 40 MG tablet Take 1 tablet by mouth every morning    finasteride (PROSCAR) 5 MG tablet Take 1 tablet by mouth daily    famotidine (PEPCID) 40 MG tablet Take 1 tablet by mouth 2 times daily    lisinopril (PRINIVIL;ZESTRIL) 2.5 MG tablet Take 1 tablet by mouth daily    polycarbophil (FIBERCON) 625 MG tablet Take 2 tablets by mouth 2 times daily    diclofenac sodium (VOLTAREN) 1 % GEL Apply 1-2 g topically as needed    Mycophenolate Sodium 360 MG TBEC TAKE 1 TABLET BY MOUTH IN THE MORNING AND 2 TABLETS (720 MG) IN THE AFTERNOON    ondansetron (ZOFRAN-ODT) 8 MG TBDP disintegrating tablet Take 1 tablet by mouth every 8 hours as needed for Nausea    ondansetron (ZOFRAN) 4 MG tablet Take 1 tablet by mouth 3 times daily as needed for Nausea or Vomiting    gabapentin (NEURONTIN) 600 MG tablet TAKE 1/2 TO 1 (ONE-HALF TO ONE) TABLET BY MOUTH TWICE DAILY AFTER DIALYSIS FOR PAIN MAX DAILY AMOUNT 2 TABLETS    sodium chloride (AYR) 0.65 %

## 2024-02-26 ENCOUNTER — OFFICE VISIT (OUTPATIENT)
Dept: ENT CLINIC | Age: 59
End: 2024-02-26
Payer: MEDICARE

## 2024-02-26 VITALS
WEIGHT: 181 LBS | HEIGHT: 69 IN | BODY MASS INDEX: 26.81 KG/M2 | SYSTOLIC BLOOD PRESSURE: 122 MMHG | DIASTOLIC BLOOD PRESSURE: 82 MMHG

## 2024-02-26 DIAGNOSIS — H65.493 COME (CHRONIC OTITIS MEDIA WITH EFFUSION), BILATERAL: Primary | ICD-10-CM

## 2024-02-26 PROCEDURE — 3079F DIAST BP 80-89 MM HG: CPT | Performed by: OTOLARYNGOLOGY

## 2024-02-26 PROCEDURE — 1036F TOBACCO NON-USER: CPT | Performed by: OTOLARYNGOLOGY

## 2024-02-26 PROCEDURE — 99213 OFFICE O/P EST LOW 20 MIN: CPT | Performed by: OTOLARYNGOLOGY

## 2024-02-26 PROCEDURE — G8428 CUR MEDS NOT DOCUMENT: HCPCS | Performed by: OTOLARYNGOLOGY

## 2024-02-26 PROCEDURE — 3017F COLORECTAL CA SCREEN DOC REV: CPT | Performed by: OTOLARYNGOLOGY

## 2024-02-26 PROCEDURE — 69433 CREATE EARDRUM OPENING: CPT | Performed by: OTOLARYNGOLOGY

## 2024-02-26 PROCEDURE — 3074F SYST BP LT 130 MM HG: CPT | Performed by: OTOLARYNGOLOGY

## 2024-02-26 PROCEDURE — G8484 FLU IMMUNIZE NO ADMIN: HCPCS | Performed by: OTOLARYNGOLOGY

## 2024-02-26 PROCEDURE — G8419 CALC BMI OUT NRM PARAM NOF/U: HCPCS | Performed by: OTOLARYNGOLOGY

## 2024-02-26 RX ORDER — PLECANATIDE 3 MG/1
1 TABLET ORAL DAILY
COMMUNITY
Start: 2024-02-22

## 2024-02-26 ASSESSMENT — ENCOUNTER SYMPTOMS
WHEEZING: 0
COLOR CHANGE: 0
COUGH: 0
EYE PAIN: 0
DIARRHEA: 0
VOMITING: 0

## 2024-02-26 NOTE — PROGRESS NOTES
Chief Complaint   Patient presents with    Ear Problem     Patient states that for three weeks he hasn't been able to hear out of his right ear.        HPI:  Cornelius Whitmore III is a 58 y.o. male seen in follow-up for his ears. He has a history of Wegener's granulomatosis and underwent revision FESS back on 11/2/2022.  He also has a history of COME and I had to replace his ear tubes earlier this summer.  Last seen on 12/18/23.  He comes in today with 3 weeks of worsening hearing.  He denies any otalgia or otorrhea, but his hearing has remained muffled since then.  He denies any symptoms on the left side.  He has been doing great from a sinus standpoint with no increase in congestion or drainage.  No recent sinus infections.  He was recently added to the kidney transplant list.    Past Medical History, Past Surgical History, Family history, Social History, and Medications were all reviewed with the patient today and updated as necessary.     Allergies   Allergen Reactions    Codeine Itching and Nausea And Vomiting    Penicillins Hives    Statins Other (See Comments)     Patient Active Problem List   Diagnosis    CKD (chronic kidney disease) requiring chronic dialysis ([facility])    Encounter for long term current use of cyclophosphamide    DM2 (diabetes mellitus, type 2) ([facility])    Microcytic anemia    Thrombocytosis    MDD (major depressive disorder)    AD (atopic dermatitis)    OA (osteoarthritis)    CAD (coronary artery disease)    HLD (hyperlipidemia)    GERD (gastroesophageal reflux disease)    Peyronie disease    ED (erectile dysfunction)    Granulomatosis with polyangiitis with renal involvement ([facility])    BPH (benign prostatic hyperplasia)    AR (allergic rhinitis)    HTN (hypertension)    Chronic recurrent sinusitis    Peritoneal dialysis status ([facility])    Panic anxiety syndrome     Current Outpatient Medications   Medication Sig    TRULANCE 3 MG TABS Take 1 tablet by mouth daily    TOUJEO SOLOSTAR 300 UNIT/ML

## 2024-03-06 DIAGNOSIS — R05.3 CHRONIC COUGH: ICD-10-CM

## 2024-03-06 DIAGNOSIS — H65.493 COME (CHRONIC OTITIS MEDIA WITH EFFUSION), BILATERAL: Primary | ICD-10-CM

## 2024-03-06 RX ORDER — HYDROCODONE BITARTRATE AND HOMATROPINE METHYLBROMIDE ORAL SOLUTION 5; 1.5 MG/5ML; MG/5ML
2.5 LIQUID ORAL 4 TIMES DAILY PRN
Qty: 70 ML | Refills: 0 | Status: SHIPPED | OUTPATIENT
Start: 2024-03-06 | End: 2024-03-13

## 2024-03-06 RX ORDER — HYDROCODONE BITARTRATE AND HOMATROPINE METHYLBROMIDE ORAL SOLUTION 5; 1.5 MG/5ML; MG/5ML
2.5 LIQUID ORAL EVERY 4 HOURS PRN
Qty: 250 ML | Refills: 0 | Status: CANCELLED | OUTPATIENT
Start: 2024-03-06 | End: 2024-04-05

## 2024-03-06 NOTE — TELEPHONE ENCOUNTER
Patient sent a Michael Biekert message;    Dr Walker,  I have been coughing again. I have congestion in my chest. It's worse at night. I need a refill for the hydroco/deepa 5-1.5/5 cough syrup.  Please call it in to the [facility] on [address] in [place].     Thanks,  London Whitmore

## 2024-04-29 ENCOUNTER — OFFICE VISIT (OUTPATIENT)
Dept: ENT CLINIC | Age: 59
End: 2024-04-29
Payer: MEDICARE

## 2024-04-29 VITALS
HEIGHT: 69 IN | WEIGHT: 176 LBS | SYSTOLIC BLOOD PRESSURE: 130 MMHG | DIASTOLIC BLOOD PRESSURE: 80 MMHG | BODY MASS INDEX: 26.07 KG/M2

## 2024-04-29 DIAGNOSIS — J32.4 CHRONIC PANSINUSITIS: Primary | ICD-10-CM

## 2024-04-29 DIAGNOSIS — Z45.89 TYMPANOSTOMY TUBE CHECK: ICD-10-CM

## 2024-04-29 DIAGNOSIS — M31.30 GRANULOMATOSIS WITH POLYANGIITIS WITHOUT RENAL INVOLVEMENT (HCC): ICD-10-CM

## 2024-04-29 PROCEDURE — 99213 OFFICE O/P EST LOW 20 MIN: CPT | Performed by: OTOLARYNGOLOGY

## 2024-04-29 PROCEDURE — G8419 CALC BMI OUT NRM PARAM NOF/U: HCPCS | Performed by: OTOLARYNGOLOGY

## 2024-04-29 PROCEDURE — 1036F TOBACCO NON-USER: CPT | Performed by: OTOLARYNGOLOGY

## 2024-04-29 PROCEDURE — 3017F COLORECTAL CA SCREEN DOC REV: CPT | Performed by: OTOLARYNGOLOGY

## 2024-04-29 PROCEDURE — G8428 CUR MEDS NOT DOCUMENT: HCPCS | Performed by: OTOLARYNGOLOGY

## 2024-04-29 PROCEDURE — 3079F DIAST BP 80-89 MM HG: CPT | Performed by: OTOLARYNGOLOGY

## 2024-04-29 PROCEDURE — 3075F SYST BP GE 130 - 139MM HG: CPT | Performed by: OTOLARYNGOLOGY

## 2024-04-29 ASSESSMENT — ENCOUNTER SYMPTOMS
EYE PAIN: 0
COUGH: 0
COLOR CHANGE: 0
DIARRHEA: 0
WHEEZING: 0
VOMITING: 0

## 2024-04-29 NOTE — PROGRESS NOTES
Chief Complaint   Patient presents with    Follow-up     Patient states that he is doing ok.       HPI:  Cornelius Whitmore III is a 58 y.o. male seen in follow-up for his sinuses.  He has a history of Wegener's granulomatosis and underwent revision FESS back on 11/2/2022.  He also has a history of COME and I had to replace his ear tubes earlier this summer.  Last seen on 2/26/24 and I had to replace his ear tubes at that time, as both of his previous tubes had extruded.  Today, he is doing well with no recent sinus infections, and he has adequate airflow bilaterally and denies any facial pain/pressure.  He has been taking Mucinex as needed and continues on Zyrtec daily.  He irrigates out his nose about 2-3 times per week.  I have refilled his cough syrup earlier this March which seem to help with his chronic cough and chest congestion.  He did undergo incision and drainage of a right nasal lacrimal duct abscess with Marco Bergman about 1 month ago, which has fortunately not recurred.    Past Medical History, Past Surgical History, Family history, Social History, and Medications were all reviewed with the patient today and updated as necessary.     Allergies   Allergen Reactions    Codeine Itching and Nausea And Vomiting    Penicillins Hives    Statins Other (See Comments)     Patient Active Problem List   Diagnosis    CKD (chronic kidney disease) requiring chronic dialysis ([facility])    Encounter for long term current use of cyclophosphamide    DM2 (diabetes mellitus, type 2) ([facility])    Microcytic anemia    Thrombocytosis    MDD (major depressive disorder)    AD (atopic dermatitis)    OA (osteoarthritis)    CAD (coronary artery disease)    HLD (hyperlipidemia)    GERD (gastroesophageal reflux disease)    Peyronie disease    ED (erectile dysfunction)    Granulomatosis with polyangiitis with renal involvement ([facility])    BPH (benign prostatic hyperplasia)    AR (allergic rhinitis)    HTN (hypertension)    Chronic recurrent

## 2024-08-12 DIAGNOSIS — R05.3 CHRONIC COUGH: Primary | ICD-10-CM

## 2024-08-12 RX ORDER — LEVOFLOXACIN 500 MG/1
500 TABLET, FILM COATED ORAL DAILY
Qty: 10 TABLET | Refills: 0 | Status: SHIPPED | OUTPATIENT
Start: 2024-08-12 | End: 2024-08-22

## 2024-08-12 RX ORDER — HYDROCODONE BITARTRATE AND HOMATROPINE METHYLBROMIDE ORAL SOLUTION 5; 1.5 MG/5ML; MG/5ML
5 LIQUID ORAL 4 TIMES DAILY PRN
Qty: 250 ML | Refills: 0 | Status: SHIPPED | OUTPATIENT
Start: 2024-08-12 | End: 2024-09-11

## 2024-08-26 ENCOUNTER — OFFICE VISIT (OUTPATIENT)
Dept: ENT CLINIC | Age: 59
End: 2024-08-26
Payer: MEDICARE

## 2024-08-26 VITALS — BODY MASS INDEX: 26.06 KG/M2 | HEIGHT: 69 IN | RESPIRATION RATE: 10 BRPM | WEIGHT: 175.93 LBS

## 2024-08-26 DIAGNOSIS — J32.4 CHRONIC PANSINUSITIS: Primary | ICD-10-CM

## 2024-08-26 DIAGNOSIS — R05.3 CHRONIC COUGH: ICD-10-CM

## 2024-08-26 PROCEDURE — G8427 DOCREV CUR MEDS BY ELIG CLIN: HCPCS | Performed by: OTOLARYNGOLOGY

## 2024-08-26 PROCEDURE — G8419 CALC BMI OUT NRM PARAM NOF/U: HCPCS | Performed by: OTOLARYNGOLOGY

## 2024-08-26 PROCEDURE — 31575 DIAGNOSTIC LARYNGOSCOPY: CPT | Performed by: OTOLARYNGOLOGY

## 2024-08-26 PROCEDURE — 1036F TOBACCO NON-USER: CPT | Performed by: OTOLARYNGOLOGY

## 2024-08-26 PROCEDURE — 99213 OFFICE O/P EST LOW 20 MIN: CPT | Performed by: OTOLARYNGOLOGY

## 2024-08-26 PROCEDURE — 3017F COLORECTAL CA SCREEN DOC REV: CPT | Performed by: OTOLARYNGOLOGY

## 2024-08-26 RX ORDER — PREDNISONE 20 MG/1
40 TABLET ORAL DAILY
Qty: 14 TABLET | Refills: 0 | Status: SHIPPED | OUTPATIENT
Start: 2024-08-26 | End: 2024-09-02

## 2024-08-26 ASSESSMENT — ENCOUNTER SYMPTOMS
ALLERGIC/IMMUNOLOGIC NEGATIVE: 1
RESPIRATORY NEGATIVE: 1
GASTROINTESTINAL NEGATIVE: 1
EYES NEGATIVE: 1

## 2024-08-26 NOTE — PROGRESS NOTES
Chief Complaint   Patient presents with    Follow-up     4 Mos sinus check , just finish an antibiotic but still has cough        HPI:  Cornelius Whitmore III is a 58 y.o. male seen in follow-up for his sinuses. He has a history of Wegener's granulomatosis and underwent revision FESS back on 11/2/2022.  He also has a history of COME and I had to replace his ear tubes last summer.  Last seen on 4/29/24.  He had messaged me through [facility] earlier this month with worsening cough and sinus congestion/drainage, and I had refilled his cough syrup and also prescribed Levaquin for 10 days.  Today, he is feeling better with less chest congestion, although he still reports a cough.  There is less production of sputum overall, and he denies any shortness of breath.  He is breathing well out of both sides and denies any significant nasal drainage today.  He has even had to use his inhaler more frequently due to his cough.  No recent fevers or other systemic symptoms.  He has been doing well from an ear standpoint with stable hearing and no recent otorrhea or otalgia.    Past Medical History, Past Surgical History, Family history, Social History, and Medications were all reviewed with the patient today and updated as necessary.     Allergies   Allergen Reactions    Codeine Itching and Nausea And Vomiting    Penicillins Hives    Statins Other (See Comments)     Patient Active Problem List   Diagnosis    CKD (chronic kidney disease) requiring chronic dialysis (HCC)    Encounter for long term current use of cyclophosphamide    DM2 (diabetes mellitus, type 2) ([facility])    Microcytic anemia    Thrombocytosis    MDD (major depressive disorder)    AD (atopic dermatitis)    OA (osteoarthritis)    CAD (coronary artery disease)    HLD (hyperlipidemia)    GERD (gastroesophageal reflux disease)    Peyronie disease    ED (erectile dysfunction)    Granulomatosis with polyangiitis with renal involvement ([facility])    BPH (benign prostatic  hyperplasia)    AR (allergic rhinitis)    HTN (hypertension)    Chronic recurrent sinusitis    Peritoneal dialysis status (HCC)    Panic anxiety syndrome     Current Outpatient Medications   Medication Sig    predniSONE (DELTASONE) 20 MG tablet Take 2 tablets by mouth daily for 7 days    HYDROcodone homatropine (HYCODAN) 5-1.5 MG/5ML solution Take 5 mLs by mouth 4 times daily as needed (cough) for up to 30 days. Max Daily Amount: 20 mLs    TRULANCE 3 MG TABS Take 1 tablet by mouth daily    TOUJEO SOLOSTAR 300 UNIT/ML concentrated injection pen INJECT 6 UNITS SUBCUTANEOUSLY DAILY IF 2 DAYS BS>130, INCREASE BY 2 UNITS. MAX 36 UNITS PER DAY    diclofenac (FLECTOR) 1.3 % PTCH patch USE 1 PATCH TOPICALLY ONCE DAILY AS NEEDED FOR BACK PAIN    DENTA 5000 PLUS 1.1 % CREA BRUSH ON TEETH ONCE PER DAY    OZEMPIC, 1 MG/DOSE, 4 MG/3ML SOPN     sertraline (ZOLOFT) 50 MG tablet Take 1 tablet by mouth daily    potassium chloride (KLOR-CON M) 20 MEQ extended release tablet TAKE 2 TABLETS BY MOUTH ONCE DAILY FOR 2 DAYS THEN 1 TABLET ONCE DAILY    REPATHA SURECLICK 140 MG/ML SOAJ INJECT 1 ML SUBCUTANEOUSLY EVERY 14 DAYS    gentamicin (GARAMYCIN) 0.1 % cream APPLY CREAM TOPICALLY TO AFFECTED AREA(S) WITH DRESSING CHANGES    TAVNEOS 10 MG CAPS     metOLazone (ZAROXOLYN) 10 MG tablet TAKE ONE 1 TABLET BY MOUTH IN THE MORNING UP TO 3 DAYS AS NEEDED FOR FLUID REMOVAL    sulfamethoxazole-trimethoprim (BACTRIM;SEPTRA) 400-80 MG per tablet TAKE 1 TABLET BY MOUTH THREE TIMES A WEEK ON MONDAY, WEDNESDAY AND FRIDAY    PARoxetine (PAXIL) 40 MG tablet Take 1 tablet by mouth every morning    finasteride (PROSCAR) 5 MG tablet Take 1 tablet by mouth daily    famotidine (PEPCID) 40 MG tablet Take 1 tablet by mouth 2 times daily    lisinopril (PRINIVIL;ZESTRIL) 2.5 MG tablet Take 1 tablet by mouth daily    polycarbophil (FIBERCON) 625 MG tablet Take 2 tablets by mouth 2 times daily    diclofenac sodium (VOLTAREN) 1 % GEL Apply 1-2 g topically as needed

## 2024-10-10 DIAGNOSIS — H65.493 CHRONIC OTITIS MEDIA OF BOTH EARS WITH EFFUSION: Primary | ICD-10-CM

## 2024-10-10 RX ORDER — OFLOXACIN 3 MG/ML
5 SOLUTION AURICULAR (OTIC) 3 TIMES DAILY
Qty: 3.75 ML | Refills: 0 | Status: SHIPPED | OUTPATIENT
Start: 2024-10-10 | End: 2024-10-15

## 2024-10-17 ENCOUNTER — HOSPITAL ENCOUNTER (OUTPATIENT)
Dept: LAB | Age: 59
Discharge: HOME | End: 2024-10-20
Payer: MEDICARE

## 2024-10-17 ENCOUNTER — OUTSIDE SERVICES (OUTPATIENT)
Dept: ENT CLINIC | Age: 59
End: 2024-10-17
Payer: COMMERCIAL

## 2024-10-17 ENCOUNTER — OUTSIDE SERVICES (OUTPATIENT)
Dept: ENT CLINIC | Age: 59
End: 2024-10-17

## 2024-10-17 DIAGNOSIS — H65.493 COME (CHRONIC OTITIS MEDIA WITH EFFUSION), BILATERAL: Primary | ICD-10-CM

## 2024-10-17 LAB — POTASSIUM SERPL-SCNC: 3.9 MMOL/L (ref 3.5–5.1)

## 2024-10-17 PROCEDURE — 69436 CREATE EARDRUM OPENING: CPT | Performed by: OTOLARYNGOLOGY

## 2024-10-17 PROCEDURE — 84132 ASSAY OF SERUM POTASSIUM: CPT

## 2024-10-17 NOTE — PROGRESS NOTES
[facility] Operative Note    Patient: Cornelius Celaya-[id number]    Pre-op diagnosis: Chronic otitis media with effusion    Post-op diagnosis: Chronic otitis media with effusion    Procedure: Bilateral myringotomy with placement of T tubes    Operative Surgeon: Albaro Armstrong MD    Anesthesia: General Endotracheal    Anesthesiologist: Adam Enriquez MD    Operative findings: Both tympanic membranes were severely retracted and there were serous middle ear effusions present.  Modified Márquez T tubes were placed bilaterally.    IV fluid: Minimal    Estimated blood loss: Minimal    Drains: None    Specimens: None    Complications: None    Disposition: PACU then home    Condition: Stable    Brief history: Mr. Celaya is a 58-year-old male with a history of chronic otitis media with effusion and underlying Wegener's granulomatosis.  I had placed tubes in his ears in the office earlier this year, but both have since extruded, and he had reaccumulated middle ear effusions.  The decision was made to take him to the operating room for placement of T tubes under anesthesia    Description of procedure: The patient was brought back to the operating room and placed on the table in a supine position.  General endotracheal anesthesia was inducted without complications, as he had been on Ozempic within the past week.  Once the patient was adequately sedated, the operating microscope was brought to the field.  I began on the left side.  There was a normal-appearing external ear with just a minimal amount of cerumen which was cleaned out under microscopy.  I then visualized the tympanic membrane which was intact but severely retracted and there was a serous middle ear effusion present.  I used a myringotomy blade and made a radial incision within the anteroinferior quadrant of the drum and after suctioning out this serous effusion, a modified Márquez T-tube was placed without difficulty.  A similar procedure was performed on

## 2024-11-15 ENCOUNTER — OFFICE VISIT (OUTPATIENT)
Dept: ENT CLINIC | Age: 59
End: 2024-11-15

## 2024-11-15 VITALS — HEIGHT: 69 IN | WEIGHT: 175.93 LBS | BODY MASS INDEX: 26.06 KG/M2 | RESPIRATION RATE: 10 BRPM

## 2024-11-15 DIAGNOSIS — Z45.89 TYMPANOSTOMY TUBE CHECK: ICD-10-CM

## 2024-11-15 DIAGNOSIS — J32.4 CHRONIC PANSINUSITIS: ICD-10-CM

## 2024-11-15 DIAGNOSIS — R05.3 CHRONIC COUGH: Primary | ICD-10-CM

## 2024-11-15 PROCEDURE — 99024 POSTOP FOLLOW-UP VISIT: CPT | Performed by: OTOLARYNGOLOGY

## 2024-11-15 RX ORDER — HYDROCODONE BITARTRATE AND HOMATROPINE METHYLBROMIDE ORAL SOLUTION 5; 1.5 MG/5ML; MG/5ML
2.5 LIQUID ORAL EVERY 6 HOURS PRN
Qty: 250 ML | Refills: 0 | Status: SHIPPED | OUTPATIENT
Start: 2024-11-15 | End: 2024-11-29

## 2024-11-15 NOTE — PROGRESS NOTES
Cornelius Whitmore III is a 58 y.o. male seen today now month postop after undergoing placement of T tubes back on 10/17/24. He has a history of Wegener's granulomatosis and underwent revision FESS back on 11/2/2022.  Doing well since tube placement, but his right ear feels slightly more stopped up compared to the left side.  There has been no otorrhea or otalgia at all.  He still reports a chronic cough, mainly at nighttime, and request a refill on his cough syrup.  His sinuses have remained stable with excellent airflow bilaterally, and no increase in nasal drainage at all.    Resp 10   Ht 1.753 m (5' 9.02\")   Wt 79.8 kg (175 lb 14.8 oz)   BMI 25.97 kg/m²    -Both EACs are clear with no drainage or cerumen, there are patent and well-positioned T tubes bilaterally.  Both middle ear spaces are clear.  No granulation at all.  -Intranasally, there are changes consistent with previous sinus surgery with some scarring seen within both middle meatuses but no polyps or purulence.    A/P:   Diagnosis Orders   1. Chronic cough  HYDROcodone homatropine (HYCODAN) 5-1.5 MG/5ML solution      2. Tympanostomy tube check        3. Chronic pansinusitis          Doing great now 1 month out from his T-tube placement.  Both tubes are in good position and working well.  He has been stable from a sinus standpoint and has no signs of infection on exam today.  I did refill his codeine cough syrup today.  RTC in 4 months for another sinus/tube check.    Albaro Armstrong MD

## 2025-02-17 ENCOUNTER — OFFICE VISIT (OUTPATIENT)
Dept: ENT CLINIC | Age: 60
End: 2025-02-17
Payer: MEDICARE

## 2025-02-17 VITALS — WEIGHT: 173.2 LBS | RESPIRATION RATE: 12 BRPM | BODY MASS INDEX: 25.65 KG/M2 | HEIGHT: 69 IN

## 2025-02-17 DIAGNOSIS — Z45.89 TYMPANOSTOMY TUBE CHECK: Primary | ICD-10-CM

## 2025-02-17 DIAGNOSIS — J32.4 CHRONIC PANSINUSITIS: ICD-10-CM

## 2025-02-17 PROCEDURE — 1036F TOBACCO NON-USER: CPT | Performed by: OTOLARYNGOLOGY

## 2025-02-17 PROCEDURE — G8427 DOCREV CUR MEDS BY ELIG CLIN: HCPCS | Performed by: OTOLARYNGOLOGY

## 2025-02-17 PROCEDURE — 3017F COLORECTAL CA SCREEN DOC REV: CPT | Performed by: OTOLARYNGOLOGY

## 2025-02-17 PROCEDURE — G8419 CALC BMI OUT NRM PARAM NOF/U: HCPCS | Performed by: OTOLARYNGOLOGY

## 2025-02-17 PROCEDURE — 99213 OFFICE O/P EST LOW 20 MIN: CPT | Performed by: OTOLARYNGOLOGY

## 2025-02-17 ASSESSMENT — ENCOUNTER SYMPTOMS
EYES NEGATIVE: 1
ALLERGIC/IMMUNOLOGIC NEGATIVE: 1
RESPIRATORY NEGATIVE: 1
GASTROINTESTINAL NEGATIVE: 1

## 2025-02-17 NOTE — PROGRESS NOTES
cream Apply to affected area bid prn for up to 2 weeks-avoid face and groin    minoxidil (LONITEN) 10 MG tablet TAKE 1/2 (ONE-HALF) TABLET BY MOUTH IN THE EVENING    ezetimibe (ZETIA) 10 MG tablet TAKE 1 TABLET BY MOUTH ONCE DAILY    sevelamer hcl (RENAGEL) 800 MG tablet Take 2 tablets by mouth 3 times daily (with meals)    sevelamer (RENVELA) 800 MG tablet TAKE 3 TABLETS BY MOUTH WITH MEALS THEN 1 WITH SNACKS    predniSONE (DELTASONE) 20 MG tablet Take 4 tablets by mouth daily    pantoprazole (PROTONIX) 40 MG tablet Take 1 tablet by mouth 2 times daily    nitroGLYCERIN (NITROSTAT) 0.4 MG SL tablet Place 1 tablet under the tongue every 5 minutes as needed    Coenzyme Q10 300 MG CAPS Take 1 capsule by mouth daily    alirocumab (PRALUENT) 75 MG/ML SOAJ injection pen INJECT 75MG SUBCUTANEOUSLY EVERY 14 DAYS    amLODIPine (NORVASC) 10 MG tablet Take 1 tablet by mouth daily    aspirin 81 MG EC tablet Take by mouth daily    budesonide (PULMICORT) 0.5 MG/2ML nebulizer suspension Inhale 2 mLs into the lungs 2 times daily    furosemide (LASIX) 80 MG tablet Take 1 tablet by mouth every other day    nebivolol (BYSTOLIC) 10 MG tablet Take 1 tablet by mouth daily    trimethoprim-polymyxin b (POLYTRIM) 93670-7.1 UNIT/ML-% ophthalmic solution 1 drop daily    traMADol (ULTRAM) 50 MG tablet Take 1 tablet by mouth every 6 hours as needed.    triamcinolone acetonide (KENALOG) 0.1 % paste Place onto teeth 2 times daily    TRULANCE 3 MG TABS Take 1 tablet by mouth daily    senna (SENOKOT) 8.6 MG tablet Take 1 tablet by mouth as needed    gabapentin (NEURONTIN) 300 MG capsule 1 capsule     No current facility-administered medications for this visit.     Past Medical History:   Diagnosis Date    Allergic rhinitis 5/8/2013    Benign prostatic hyperplasia without lower urinary tract symptoms 6/22/2016    CAD (coronary artery disease)     catherization    Chronic back pain     cervico-thoracic facet jt INJs done at [facility] pain [facility], last

## 2025-08-18 ENCOUNTER — OFFICE VISIT (OUTPATIENT)
Dept: ENT CLINIC | Age: 60
End: 2025-08-18
Payer: MEDICARE

## 2025-08-18 VITALS — WEIGHT: 156.2 LBS | HEIGHT: 69 IN | RESPIRATION RATE: 10 BRPM | BODY MASS INDEX: 23.13 KG/M2

## 2025-08-18 DIAGNOSIS — Z45.89 TYMPANOSTOMY TUBE CHECK: Primary | ICD-10-CM

## 2025-08-18 DIAGNOSIS — J32.4 CHRONIC PANSINUSITIS: ICD-10-CM

## 2025-08-18 PROCEDURE — 99213 OFFICE O/P EST LOW 20 MIN: CPT | Performed by: OTOLARYNGOLOGY

## 2025-08-18 PROCEDURE — 31237 NSL/SINS NDSC SURG BX POLYPC: CPT | Performed by: OTOLARYNGOLOGY

## 2025-08-18 PROCEDURE — 4004F PT TOBACCO SCREEN RCVD TLK: CPT | Performed by: OTOLARYNGOLOGY

## 2025-08-18 PROCEDURE — G8420 CALC BMI NORM PARAMETERS: HCPCS | Performed by: OTOLARYNGOLOGY

## 2025-08-18 PROCEDURE — 3017F COLORECTAL CA SCREEN DOC REV: CPT | Performed by: OTOLARYNGOLOGY

## 2025-08-18 PROCEDURE — G8427 DOCREV CUR MEDS BY ELIG CLIN: HCPCS | Performed by: OTOLARYNGOLOGY

## 2025-08-18 RX ORDER — OXYCODONE HYDROCHLORIDE 5 MG/1
TABLET ORAL
COMMUNITY
Start: 2025-07-10

## 2025-08-18 RX ORDER — MYCOPHENOLATE MOFETIL 250 MG/1
CAPSULE ORAL
COMMUNITY
Start: 2025-06-17

## 2025-08-18 RX ORDER — INSULIN LISPRO 100 [IU]/ML
2-10 INJECTION, SOLUTION INTRAVENOUS; SUBCUTANEOUS
COMMUNITY
Start: 2025-06-20

## 2025-08-18 RX ORDER — VALGANCICLOVIR 450 MG/1
TABLET, FILM COATED ORAL
COMMUNITY

## 2025-08-18 RX ORDER — CETIRIZINE HYDROCHLORIDE 10 MG/1
10 TABLET ORAL DAILY
COMMUNITY

## 2025-08-18 RX ORDER — METHOCARBAMOL 750 MG/1
TABLET, FILM COATED ORAL
COMMUNITY
Start: 2025-06-20

## 2025-08-18 RX ORDER — DOCUSATE SODIUM 100 MG/1
100 CAPSULE, LIQUID FILLED ORAL 2 TIMES DAILY
COMMUNITY
Start: 2025-06-09

## 2025-08-18 RX ORDER — TACROLIMUS 1 MG/1
3 CAPSULE ORAL 2 TIMES DAILY
COMMUNITY
Start: 2025-08-08 | End: 2026-08-03

## 2025-08-18 ASSESSMENT — ENCOUNTER SYMPTOMS
GASTROINTESTINAL NEGATIVE: 1
ALLERGIC/IMMUNOLOGIC NEGATIVE: 1
EYES NEGATIVE: 1
RESPIRATORY NEGATIVE: 1

## (undated) DEVICE — NDL PRT INJ NSAF BLNT 18GX1.5 --

## (undated) DEVICE — GARMENT,MEDLINE,DVT,INT,CALF,MED, GEN2: Brand: MEDLINE

## (undated) DEVICE — CANISTER, RIGID, 2000CC: Brand: MEDLINE INDUSTRIES, INC.

## (undated) DEVICE — CODMAN® SURGICAL PATTIES 1" X 3" (2.54CM X 7.62CM): Brand: CODMAN®

## (undated) DEVICE — CONNECTOR TBNG OD5-7MM O2 END DISP

## (undated) DEVICE — SYRINGE MED 50ML LUERLOCK TIP

## (undated) DEVICE — KIT PROCEDURE SURG HEAD AND NECK TOTE

## (undated) DEVICE — KENDALL RADIOLUCENT FOAM MONITORING ELECTRODE RECTANGULAR SHAPE: Brand: KENDALL

## (undated) DEVICE — GLOVE ORANGE PI 7 1/2   MSG9075

## (undated) DEVICE — TUBING, SUCTION, 1/4" X 10', STRAIGHT: Brand: MEDLINE

## (undated) DEVICE — SOLUTION IV 1000ML LAC RINGERS PH 6.5 INJ USP VIAFLX PLAS

## (undated) DEVICE — KIT,ANTI FOG,W/SPONGE & FLUID,SOFT PACK: Brand: MEDLINE

## (undated) DEVICE — CANNULA NSL ORAL AD FOR CAPNOFLEX CO2 O2 AIRLFE

## (undated) DEVICE — CONTAINER,SPECIMEN,OR STERILE,4OZ: Brand: MEDLINE

## (undated) DEVICE — SYR 3ML LL TIP 1/10ML GRAD --

## (undated) DEVICE — BLADE 1884004HR TRICUT 5PK M4 4MM ROTATE: Brand: TRICUT

## (undated) DEVICE — SOLUTION IRRIG 1000ML 0.9% SOD CHL USP POUR PLAS BTL

## (undated) DEVICE — SYR 5ML 1/5 GRAD LL NSAF LF --